# Patient Record
Sex: MALE | Race: WHITE | Employment: FULL TIME | URBAN - METROPOLITAN AREA
[De-identification: names, ages, dates, MRNs, and addresses within clinical notes are randomized per-mention and may not be internally consistent; named-entity substitution may affect disease eponyms.]

---

## 2017-02-20 ENCOUNTER — TELEPHONE (OUTPATIENT)
Dept: FAMILY MEDICINE CLINIC | Age: 52
End: 2017-02-20

## 2017-02-20 DIAGNOSIS — J44.9 CHRONIC OBSTRUCTIVE PULMONARY DISEASE, UNSPECIFIED COPD TYPE (HCC): Primary | ICD-10-CM

## 2017-02-20 RX ORDER — ALBUTEROL SULFATE 90 UG/1
1 AEROSOL, METERED RESPIRATORY (INHALATION)
Qty: 1 INHALER | Refills: 2 | Status: SHIPPED | OUTPATIENT
Start: 2017-02-20 | End: 2019-12-12 | Stop reason: SDUPTHER

## 2017-02-20 RX ORDER — FLUTICASONE PROPIONATE AND SALMETEROL 250; 50 UG/1; UG/1
1 POWDER RESPIRATORY (INHALATION) EVERY 12 HOURS
Qty: 1 INHALER | Refills: 5 | Status: SHIPPED | OUTPATIENT
Start: 2017-02-20 | End: 2017-02-22 | Stop reason: CLARIF

## 2017-02-20 NOTE — TELEPHONE ENCOUNTER
Pt needs a refill on his inhalers, ins will not cover the current inhalers (Dulera and albuterol) it will cover the Advair or symbicort in place of Dulera and Proair HFA or Entolin HFA in place of albuterol, CB# [phone] to speak with wife, Sharmila Jay on hipaa.

## 2017-03-18 ENCOUNTER — ED HISTORICAL/CONVERTED ENCOUNTER (OUTPATIENT)
Dept: OTHER | Age: 52
End: 2017-03-18

## 2017-05-02 ENCOUNTER — DOCUMENTATION ONLY (OUTPATIENT)
Dept: FAMILY MEDICINE CLINIC | Age: 52
End: 2017-05-02

## 2017-05-04 ENCOUNTER — TELEPHONE (OUTPATIENT)
Dept: FAMILY MEDICINE CLINIC | Age: 52
End: 2017-05-04

## 2017-05-04 NOTE — TELEPHONE ENCOUNTER
[facility] called wanting to change the [facility] to something else, isn't covered even with prior auth    [phone]

## 2017-05-25 DIAGNOSIS — E03.9 HYPOTHYROIDISM, UNSPECIFIED TYPE: ICD-10-CM

## 2017-05-26 RX ORDER — LEVOTHYROXINE SODIUM 25 UG/1
TABLET ORAL
Qty: 30 TAB | Refills: 2 | Status: SHIPPED | OUTPATIENT
Start: 2017-05-26 | End: 2017-08-29 | Stop reason: SDUPTHER

## 2017-08-29 DIAGNOSIS — E03.9 HYPOTHYROIDISM, UNSPECIFIED TYPE: ICD-10-CM

## 2017-08-29 RX ORDER — LEVOTHYROXINE SODIUM 25 UG/1
TABLET ORAL
Qty: 30 TAB | Refills: 0 | Status: SHIPPED | OUTPATIENT
Start: 2017-08-29 | End: 2017-10-02 | Stop reason: SDUPTHER

## 2017-09-12 ENCOUNTER — OFFICE VISIT (OUTPATIENT)
Dept: FAMILY MEDICINE CLINIC | Age: 52
End: 2017-09-12

## 2017-09-12 VITALS
OXYGEN SATURATION: 98 % | SYSTOLIC BLOOD PRESSURE: 132 MMHG | HEIGHT: 71 IN | DIASTOLIC BLOOD PRESSURE: 88 MMHG | RESPIRATION RATE: 17 BRPM | TEMPERATURE: 98.2 F | WEIGHT: 199.2 LBS | HEART RATE: 74 BPM | BODY MASS INDEX: 27.89 KG/M2

## 2017-09-12 DIAGNOSIS — R73.03 PREDIABETES: Primary | ICD-10-CM

## 2017-09-12 DIAGNOSIS — G47.00 INSOMNIA, UNSPECIFIED: ICD-10-CM

## 2017-09-12 DIAGNOSIS — E03.9 HYPOTHYROIDISM, UNSPECIFIED TYPE: ICD-10-CM

## 2017-09-12 DIAGNOSIS — G47.00 INSOMNIA, UNSPECIFIED TYPE: ICD-10-CM

## 2017-09-12 DIAGNOSIS — E78.2 MIXED HYPERLIPIDEMIA: ICD-10-CM

## 2017-09-12 DIAGNOSIS — Z12.11 SCREEN FOR COLON CANCER: ICD-10-CM

## 2017-09-12 RX ORDER — CYCLOBENZAPRINE HCL 10 MG
10 TABLET ORAL
Qty: 30 TAB | Refills: 2 | Status: SHIPPED | OUTPATIENT
Start: 2017-09-12 | End: 2018-06-01 | Stop reason: ALTCHOICE

## 2017-09-12 RX ORDER — ZOLPIDEM TARTRATE 10 MG/1
10 TABLET ORAL
Qty: 30 TAB | Refills: 2 | Status: SHIPPED | OUTPATIENT
Start: 2017-09-12 | End: 2018-06-01 | Stop reason: SDUPTHER

## 2017-09-12 NOTE — PROGRESS NOTES
Chief Complaint   Patient presents with    Labs     Patient is fasting    Medication Refill     Ambien    Headache     Tension in back of neck     Patient seen in the office today for fasting labs, med refill and c/o of increased tension in the back of neck    Pt is not interested in a colonoscopy at this time, will do the FIT test.     Pt declined a flu vaccine. Subjective: (As above and below)     Chief Complaint   Patient presents with    Labs     Patient is fasting    Medication Refill     Ambien    Headache     Tension in back of neck     he is a 45y.o. year old male who presents for evaluation. Reviewed PmHx, RxHx, FmHx, SocHx, AllgHx and updated in chart. Review of Systems - negative except as listed above    Objective:     Vitals:    09/12/17 0723   BP: 132/88   Pulse: 74   Resp: 17   Temp: 98.2 °F (36.8 °C)   TempSrc: Oral   SpO2: 98%   Weight: 199 lb 3.2 oz (90.4 kg)   Height: 5' 11\" (1.803 m)     Physical Examination: General appearance - alert, well appearing, and in no distress  Mental status - normal mood, behavior, speech, dress, motor activity, and thought processes  Mouth - mucous membranes moist, pharynx normal without lesions  Chest - clear to auscultation, no wheezes, rales or rhonchi, symmetric air entry  Heart - normal rate, regular rhythm, normal S1, S2, no murmurs, rubs, clicks or gallops  Musculoskeletal - no joint tenderness, deformity or swelling  Extremities - peripheral pulses normal, no pedal edema, no clubbing or cyanosis    Assessment/ Plan:   1. Prediabetes  -check fasting labs  - CBC WITH AUTOMATED DIFF  - HEMOGLOBIN A1C WITH EAG    2. Mixed hyperlipidemia  - LIPID PANEL  - METABOLIC PANEL, COMPREHENSIVE    3. Hypothyroidism, unspecified type  - TSH 3RD GENERATION  - VITAMIN D, 25 HYDROXY    4. Insomnia, unspecified type  -Ambien controlling symptoms    5. Insomnia, unspecified  - zolpidem (AMBIEN) 10 mg tablet;  Take 1 Tab by mouth nightly as needed for Sleep (- must last 30 days). Dispense: 30 Tab; Refill: 2    6. Screen for colon cancer  - OCCULT BLOOD, IMMUNOASSAY (FIT)     Follow-up Disposition: As needed  I have discussed the diagnosis with the patient and the intended plan as seen in the above orders. The patient has received an after-visit summary and questions were answered concerning future plans.      Medication Side Effects and Warnings were discussed with patient: yes  Patient Labs were reviewed: yes  Patient Past Records were reviewed:  yes    Zi Sandy M.D.

## 2017-09-12 NOTE — MR AVS SNAPSHOT
Visit Information Date & Time Provider Department Dept. Phone Encounter #  
 9/12/2017  7:15 AM Lucian Wilkins MD [address] [facility] [phone] [id number] Upcoming Health Maintenance Date Due FOBT Q 1 YEAR AGE 50-75 10/14/2016 DTaP/Tdap/Td series (2 - Td) 9/13/2023 Allergies as of 9/12/2017  Review Complete On: 9/12/2017 By: Lucian Wilkins MD  
  
 Severity Noted Reaction Type Reactions Voltaren [Diclofenac Sodium]  01/19/2016    Other (comments) Stomach cramps Current Immunizations  Reviewed on 9/13/2013 Name Date Tdap 9/13/2013 Not reviewed this visit You Were Diagnosed With   
  
 Codes Comments Prediabetes    -  Primary ICD-10-CM: R73.03 
ICD-9-CM: 790.29 Mixed hyperlipidemia     ICD-10-CM: E78.2 ICD-9-CM: 272.2 Hypothyroidism, unspecified type     ICD-10-CM: E03.9 ICD-9-CM: 244.9 Insomnia, unspecified type     ICD-10-CM: G47.00 ICD-9-CM: 780.52 Insomnia, unspecified     ICD-10-CM: G47.00 ICD-9-CM: 780.52 Screen for colon cancer     ICD-10-CM: Z12.11 ICD-9-CM: V76.51 Vitals BP Pulse Temp Resp Height(growth percentile) Weight(growth percentile) 132/88 (BP 1 Location: Right arm, BP Patient Position: Sitting) 74 98.2 °F (36.8 °C) (Oral) 17 5' 11\" (1.803 m) 199 lb 3.2 oz (90.4 kg) SpO2 BMI Smoking Status 98% 27.78 kg/m2 Former Smoker Vitals History BMI and BSA Data Body Mass Index Body Surface Area  
 27.78 kg/m 2 2.13 m 2 Preferred Pharmacy Pharmacy Name Phone [facility] [address] [facility] [phone] Your Updated Medication List  
  
   
This list is accurate as of: 9/12/17  8:02 AM.  Always use your most recent med list.  
  
  
  
  
 albuterol 90 mcg/actuation inhaler Commonly known as:  PROAIR HFA  
 Take 1 Puff by inhalation every four (4) hours as needed for Wheezing or Shortness of Breath. cyclobenzaprine 10 mg tablet Commonly known as:  FLEXERIL Take 1 Tab by mouth nightly. fluticasone furoate 200 mcg/actuation Dsdv inhaler Commonly known as:  ARNUITY ELLIPTA Take 1 Puff by inhalation daily. levothyroxine 25 mcg tablet Commonly known as:  SYNTHROID  
TAKE ONE TABLET BY MOUTH EVERY DAY BEFORE BREAKFAST  
  
 zolpidem 10 mg tablet Commonly known as:  AMBIEN Take 1 Tab by mouth nightly as needed for Sleep (- must last 30 days). Prescriptions Printed Refills  
 zolpidem (AMBIEN) 10 mg tablet 2 Sig: Take 1 Tab by mouth nightly as needed for Sleep (- must last 30 days). Class: Print Route: Oral  
  
Prescriptions Sent to Pharmacy Refills  
 cyclobenzaprine (FLEXERIL) 10 mg tablet 2 Sig: Take 1 Tab by mouth nightly. Class: Normal  
 Pharmacy: [facility] [place] [address] [facility] [place] [address] Ph #: [phone] Route: Oral  
  
We Performed the Following CBC WITH AUTOMATED DIFF [93866 CPT(R)] HEMOGLOBIN A1C WITH EAG [89851 CPT(R)] LIPID PANEL [06115 CPT(R)] METABOLIC PANEL, COMPREHENSIVE [49270 CPT(R)] OCCULT BLOOD, IMMUNOASSAY (FIT) [id number] CPT(R)] TSH 3RD GENERATION [16238 CPT(R)] VITAMIN D, 25 HYDROXY [id number] CPT(R)] Introducing [address]! Dear Brian Quarles: 
Thank you for requesting a [employer] account. Our records indicate that you already have an active [employer] account. You can access your account anytime at [email or web address] Did you know that you can access your hospital and ER discharge instructions at any time in 365looks? You can also review all of your test results from your hospital stay or ER visit. Additional Information If you have questions, please visit the Frequently Asked Questions section of the [employer] website at [email or web address] Remember, [employer] is NOT to be used for urgent needs. For medical emergencies, dial 911. Now available from your iPhone and Android! Please provide this summary of care documentation to your next provider. Your primary care clinician is listed as Delmis Seats. If you have any questions after today's visit, please call [phone].

## 2017-09-12 NOTE — PROGRESS NOTES
Chief Complaint   Patient presents with    Labs     Patient is fasting    Medication Refill     Ambien    Headache     Tension in back of neck     Patient seen in the office today for fasting labs, med refill and c/o of increased tension in the back of neck

## 2017-09-13 LAB
25(OH)D3+25(OH)D2 SERPL-MCNC: 23.6 NG/ML (ref 30–100)
ALBUMIN SERPL-MCNC: 4.5 G/DL (ref 3.5–5.5)
ALBUMIN/GLOB SERPL: 1.9 {RATIO} (ref 1.2–2.2)
ALP SERPL-CCNC: 60 IU/L (ref 39–117)
ALT SERPL-CCNC: 10 IU/L (ref 0–44)
AST SERPL-CCNC: 21 IU/L (ref 0–40)
BASOPHILS # BLD AUTO: 0 X10E3/UL (ref 0–0.2)
BASOPHILS NFR BLD AUTO: 1 %
BILIRUB SERPL-MCNC: 0.2 MG/DL (ref 0–1.2)
BUN SERPL-MCNC: 12 MG/DL (ref 6–24)
BUN/CREAT SERPL: 12 (ref 9–20)
CALCIUM SERPL-MCNC: 9.4 MG/DL (ref 8.7–10.2)
CHLORIDE SERPL-SCNC: 105 MMOL/L (ref 96–106)
CHOLEST SERPL-MCNC: 191 MG/DL (ref 100–199)
CO2 SERPL-SCNC: 24 MMOL/L (ref 18–29)
CREAT SERPL-MCNC: 0.99 MG/DL (ref 0.76–1.27)
EOSINOPHIL # BLD AUTO: 0.2 X10E3/UL (ref 0–0.4)
EOSINOPHIL NFR BLD AUTO: 5 %
ERYTHROCYTE [DISTWIDTH] IN BLOOD BY AUTOMATED COUNT: 13.4 % (ref 12.3–15.4)
EST. AVERAGE GLUCOSE BLD GHB EST-MCNC: 117 MG/DL
GLOBULIN SER CALC-MCNC: 2.4 G/DL (ref 1.5–4.5)
GLUCOSE SERPL-MCNC: 97 MG/DL (ref 65–99)
HBA1C MFR BLD: 5.7 % (ref 4.8–5.6)
HCT VFR BLD AUTO: 44.9 % (ref 37.5–51)
HDLC SERPL-MCNC: 60 MG/DL
HGB BLD-MCNC: 14.9 G/DL (ref 12.6–17.7)
IMM GRANULOCYTES # BLD: 0 X10E3/UL (ref 0–0.1)
IMM GRANULOCYTES NFR BLD: 0 %
INTERPRETATION, 910389: NORMAL
LDLC SERPL CALC-MCNC: 113 MG/DL (ref 0–99)
LYMPHOCYTES # BLD AUTO: 2 X10E3/UL (ref 0.7–3.1)
LYMPHOCYTES NFR BLD AUTO: 43 %
MCH RBC QN AUTO: 31.8 PG (ref 26.6–33)
MCHC RBC AUTO-ENTMCNC: 33.2 G/DL (ref 31.5–35.7)
MCV RBC AUTO: 96 FL (ref 79–97)
MONOCYTES # BLD AUTO: 0.4 X10E3/UL (ref 0.1–0.9)
MONOCYTES NFR BLD AUTO: 8 %
NEUTROPHILS # BLD AUTO: 2 X10E3/UL (ref 1.4–7)
NEUTROPHILS NFR BLD AUTO: 43 %
PLATELET # BLD AUTO: 226 X10E3/UL (ref 150–379)
POTASSIUM SERPL-SCNC: 4.3 MMOL/L (ref 3.5–5.2)
PROT SERPL-MCNC: 6.9 G/DL (ref 6–8.5)
RBC # BLD AUTO: 4.68 X10E6/UL (ref 4.14–5.8)
SODIUM SERPL-SCNC: 143 MMOL/L (ref 134–144)
TRIGL SERPL-MCNC: 92 MG/DL (ref 0–149)
TSH SERPL DL<=0.005 MIU/L-ACNC: 3.19 UIU/ML (ref 0.45–4.5)
VLDLC SERPL CALC-MCNC: 18 MG/DL (ref 5–40)
WBC # BLD AUTO: 4.7 X10E3/UL (ref 3.4–10.8)

## 2017-09-13 NOTE — PROGRESS NOTES
Cholesterol is elevated, please closely monitor diet and increase exercise, recheck in 6 months. Vitamin D is slightly low, please take a daily supplement of at least 2000 IU (international units) daily. Increase in risk for diabetes, please closely monitor diet and increase exercise   All other labs are within normal limits. A message has been sent in Smeet and the lab work released to the patient.

## 2017-10-02 DIAGNOSIS — E03.9 HYPOTHYROIDISM, UNSPECIFIED TYPE: ICD-10-CM

## 2017-10-02 RX ORDER — LEVOTHYROXINE SODIUM 25 UG/1
TABLET ORAL
Qty: 30 TAB | Refills: 5 | Status: SHIPPED | OUTPATIENT
Start: 2017-10-02 | End: 2018-05-24 | Stop reason: SDUPTHER

## 2017-12-26 RX ORDER — FLUTICASONE FUROATE 200 UG/1
POWDER RESPIRATORY (INHALATION)
Qty: 30 INHALER | Refills: 2 | Status: SHIPPED | OUTPATIENT
Start: 2017-12-26 | End: 2018-06-01 | Stop reason: ALTCHOICE

## 2018-05-24 DIAGNOSIS — E03.9 HYPOTHYROIDISM, UNSPECIFIED TYPE: ICD-10-CM

## 2018-05-24 RX ORDER — LEVOTHYROXINE SODIUM 25 UG/1
TABLET ORAL
Qty: 30 TAB | Refills: 5 | Status: SHIPPED | OUTPATIENT
Start: 2018-05-24 | End: 2019-01-02 | Stop reason: SDUPTHER

## 2018-06-01 ENCOUNTER — OFFICE VISIT (OUTPATIENT)
Dept: FAMILY MEDICINE CLINIC | Age: 53
End: 2018-06-01

## 2018-06-01 VITALS
TEMPERATURE: 97.9 F | HEIGHT: 71 IN | DIASTOLIC BLOOD PRESSURE: 90 MMHG | HEART RATE: 59 BPM | OXYGEN SATURATION: 98 % | SYSTOLIC BLOOD PRESSURE: 130 MMHG | WEIGHT: 187.13 LBS | RESPIRATION RATE: 16 BRPM | BODY MASS INDEX: 26.2 KG/M2

## 2018-06-01 DIAGNOSIS — E78.2 MIXED HYPERLIPIDEMIA: ICD-10-CM

## 2018-06-01 DIAGNOSIS — R73.03 PREDIABETES: ICD-10-CM

## 2018-06-01 DIAGNOSIS — J01.00 ACUTE MAXILLARY SINUSITIS, RECURRENCE NOT SPECIFIED: ICD-10-CM

## 2018-06-01 DIAGNOSIS — E03.9 HYPOTHYROIDISM, UNSPECIFIED TYPE: ICD-10-CM

## 2018-06-01 DIAGNOSIS — R53.83 FATIGUE, UNSPECIFIED TYPE: ICD-10-CM

## 2018-06-01 DIAGNOSIS — J40 BRONCHITIS: ICD-10-CM

## 2018-06-01 DIAGNOSIS — Z00.00 WELL ADULT EXAM: Primary | ICD-10-CM

## 2018-06-01 DIAGNOSIS — G47.00 INSOMNIA, UNSPECIFIED TYPE: ICD-10-CM

## 2018-06-01 RX ORDER — PREDNISONE 20 MG/1
TABLET ORAL
Refills: 0 | COMMUNITY
Start: 2018-05-21 | End: 2018-06-01 | Stop reason: ALTCHOICE

## 2018-06-01 RX ORDER — CEFDINIR 300 MG/1
300 CAPSULE ORAL 2 TIMES DAILY
Qty: 20 CAP | Refills: 0 | Status: SHIPPED | OUTPATIENT
Start: 2018-06-01 | End: 2018-06-11

## 2018-06-01 RX ORDER — IPRATROPIUM BROMIDE 17 UG/1
AEROSOL, METERED RESPIRATORY (INHALATION)
Refills: 0 | COMMUNITY
Start: 2018-05-21 | End: 2019-12-12 | Stop reason: ALTCHOICE

## 2018-06-01 RX ORDER — FLUTICASONE FUROATE AND VILANTEROL 200; 25 UG/1; UG/1
1 POWDER RESPIRATORY (INHALATION) DAILY
Qty: 1 INHALER | Refills: 3 | Status: SHIPPED | OUTPATIENT
Start: 2018-06-01 | End: 2019-12-12 | Stop reason: ALTCHOICE

## 2018-06-01 RX ORDER — ZOLPIDEM TARTRATE 10 MG/1
10 TABLET ORAL
Qty: 30 TAB | Refills: 2 | Status: SHIPPED | OUTPATIENT
Start: 2018-06-01 | End: 2018-10-03 | Stop reason: SDUPTHER

## 2018-06-01 RX ORDER — ALBUTEROL SULFATE 0.83 MG/ML
SOLUTION RESPIRATORY (INHALATION)
Refills: 0 | COMMUNITY
Start: 2018-05-21 | End: 2019-12-12 | Stop reason: SDUPTHER

## 2018-06-01 NOTE — PROGRESS NOTES
1. Have you been to the ER, urgent care clinic since your last visit? Hospitalized since your last visit? Yes Pt 1st 2 wks ago for cough    2. Have you seen or consulted any other health care providers outside of the [address] [place] [address] since your last visit? Include any pap smears or colon screening. No    Chief Complaint   Patient presents with    Cough     Pt 1st 2 wks ago for cough, is now cough up green mucus & getting worse and sob    Medication Refill    Fatigue     x 1 yr and getting progressively getting worse     Pt states he went to Pt 1st 2 wks ago for cough. He states he is now coughing up green mucus & the cough is getting worse and he is also having sob. He also reports fatigue that is getting progressively worse over the last 1 yrs. He ran out of [facility] in Feb 2018.

## 2018-06-01 NOTE — MR AVS SNAPSHOT
[address] [place] [address] 
[phone] Patient: Shelley Johnson MRN: [id number] :1965 Visit Information Date & Time Provider Department Dept. Phone Encounter #  
 2018 10:15 AM Ahmet Anthony, [address] [phone] [id number] Upcoming Health Maintenance Date Due FOBT Q 1 YEAR AGE 50-75 10/14/2016 Influenza Age 5 to Adult 2018 DTaP/Tdap/Td series (2 - Td) 2023 Allergies as of 2018  Review Complete On: 2018 By: Ahmet Anthony NP Severity Noted Reaction Type Reactions Voltaren [Diclofenac Sodium]  2016    Other (comments) Stomach cramps Current Immunizations  Reviewed on 2013 Name Date Tdap 2013 Not reviewed this visit You Were Diagnosed With   
  
 Codes Comments Bronchitis    -  Primary ICD-10-CM: M92 ICD-9-CM: 785 Acute maxillary sinusitis, recurrence not specified     ICD-10-CM: J01.00 ICD-9-CM: 461.0 Well adult exam     ICD-10-CM: Z00.00 ICD-9-CM: V70.0 Mixed hyperlipidemia     ICD-10-CM: E78.2 ICD-9-CM: 272.2 Prediabetes     ICD-10-CM: R73.03 
ICD-9-CM: 790.29 Hypothyroidism, unspecified type     ICD-10-CM: E03.9 ICD-9-CM: 244.9 Insomnia, unspecified type     ICD-10-CM: G47.00 ICD-9-CM: 780.52 Fatigue, unspecified type     ICD-10-CM: R53.83 ICD-9-CM: 780.79 Vitals BP Pulse Temp Resp Height(growth percentile) Weight(growth percentile) 130/90 (!) 59 97.9 °F (36.6 °C) (Oral) 16 5' 11\" (1.803 m) 187 lb 2 oz (84.9 kg) SpO2 BMI Smoking Status 98% 26.1 kg/m2 Former Smoker Vitals History BMI and BSA Data Body Mass Index Body Surface Area  
 26.1 kg/m 2 2.06 m 2 Preferred Pharmacy Pharmacy Name Phone [facility] [address] [facility] [phone] Your Updated Medication List  
  
   
This list is accurate as of 6/1/18 11:20 AM.  Always use your most recent med list.  
  
  
  
  
 * albuterol 90 mcg/actuation inhaler Commonly known as:  PROAIR HFA Take 1 Puff by inhalation every four (4) hours as needed for Wheezing or Shortness of Breath. * albuterol 2.5 mg /3 mL (0.083 %) nebulizer solution Commonly known as:  PROVENTIL VENTOLIN  
INL 1 VIAL VIA THE NEB Q 4 TO 6 H PRN  
  
 ARNUITY ELLIPTA 200 mcg/actuation Dsdv inhaler Generic drug:  fluticasone furoate INHALE 1 PUFF BY MOUTH DAILY  
  
 ATROVENT HFA 17 mcg/actuation inhaler Generic drug:  ipratropium INL 2 PFS PO QID  
  
 cefdinir 300 mg capsule Commonly known as:  OMNICEF Take 1 Cap by mouth two (2) times a day for 10 days. levothyroxine 25 mcg tablet Commonly known as:  SYNTHROID  
TAKE ONE TABLET BY MOUTH EVERY DAY BEFORE BREAKFAST  
  
 zolpidem 10 mg tablet Commonly known as:  AMBIEN Take 1 Tab by mouth nightly as needed for Sleep (- must last 30 days). * Notice: This list has 2 medication(s) that are the same as other medications prescribed for you. Read the directions carefully, and ask your doctor or other care provider to review them with you. Prescriptions Printed Refills  
 zolpidem (AMBIEN) 10 mg tablet 2 Sig: Take 1 Tab by mouth nightly as needed for Sleep (- must last 30 days). Class: Print Route: Oral  
  
Prescriptions Sent to Pharmacy Refills  
 cefdinir (OMNICEF) 300 mg capsule 0 Sig: Take 1 Cap by mouth two (2) times a day for 10 days. Class: Normal  
 [facility] [address] [facility] [address] #: [phone] Route: Oral  
  
We Performed the Following CBC WITH AUTOMATED DIFF [04437 CPT(R)] HEMOGLOBIN A1C WITH EAG [00248 CPT(R)] LIPID PANEL [86547 CPT(R)] METABOLIC PANEL, COMPREHENSIVE [32510 CPT(R)] TESTOSTERONE, FREE & TOTAL [15279 CPT(R)] TSH 3RD GENERATION [56927 CPT(R)] VITAMIN B12 [id number] CPT(R)] VITAMIN D, 25 HYDROXY [id number] CPT(R)] Introducing [facility]! Dear Danika Honeycutt: 
Thank you for requesting a "[employer] account. Our records indicate that you already have an active "[employer] account. You can access your account anytime at [email or web address] Did you know that you can access your hospital and ER discharge instructions at any time in "[employer]? You can also review all of your test results from your hospital stay or ER visit. Additional Information If you have questions, please visit the Frequently Asked Questions section of the "[employer] website at [email or web address] Remember, "[employer] is NOT to be used for urgent needs. For medical emergencies, dial 911. Now available from your iPhone and Android! Please provide this summary of care documentation to your next provider. Your primary care clinician is listed as Deidre De Jesus. If you have any questions after today's visit, please call [phone].

## 2018-06-05 LAB
25(OH)D3+25(OH)D2 SERPL-MCNC: NORMAL NG/ML
ALBUMIN SERPL-MCNC: 4.4 G/DL (ref 3.5–5.5)
ALBUMIN/GLOB SERPL: 1.6 {RATIO} (ref 1.2–2.2)
ALP SERPL-CCNC: 68 IU/L (ref 39–117)
ALT SERPL-CCNC: 10 IU/L (ref 0–44)
AST SERPL-CCNC: 12 IU/L (ref 0–40)
BASOPHILS # BLD AUTO: 0 X10E3/UL (ref 0–0.2)
BASOPHILS NFR BLD AUTO: 1 %
BILIRUB SERPL-MCNC: 0.6 MG/DL (ref 0–1.2)
BUN SERPL-MCNC: 10 MG/DL (ref 6–24)
BUN/CREAT SERPL: 10 (ref 9–20)
CALCIUM SERPL-MCNC: 9.4 MG/DL (ref 8.7–10.2)
CHLORIDE SERPL-SCNC: 103 MMOL/L (ref 96–106)
CHOLEST SERPL-MCNC: 203 MG/DL (ref 100–199)
CO2 SERPL-SCNC: 18 MMOL/L (ref 18–29)
CREAT SERPL-MCNC: 1.03 MG/DL (ref 0.76–1.27)
EOSINOPHIL # BLD AUTO: 0.4 X10E3/UL (ref 0–0.4)
EOSINOPHIL NFR BLD AUTO: 6 %
ERYTHROCYTE [DISTWIDTH] IN BLOOD BY AUTOMATED COUNT: 13.1 % (ref 12.3–15.4)
EST. AVERAGE GLUCOSE BLD GHB EST-MCNC: 117 MG/DL
GFR SERPLBLD CREATININE-BSD FMLA CKD-EPI: 82 ML/MIN/1.73
GFR SERPLBLD CREATININE-BSD FMLA CKD-EPI: 95 ML/MIN/1.73
GLOBULIN SER CALC-MCNC: 2.8 G/DL (ref 1.5–4.5)
GLUCOSE SERPL-MCNC: 81 MG/DL (ref 65–99)
HBA1C MFR BLD: 5.7 % (ref 4.8–5.6)
HCT VFR BLD AUTO: 44.1 % (ref 37.5–51)
HDLC SERPL-MCNC: 55 MG/DL
HGB BLD-MCNC: 14.5 G/DL (ref 13–17.7)
IMM GRANULOCYTES # BLD: 0.1 X10E3/UL (ref 0–0.1)
IMM GRANULOCYTES NFR BLD: 1 %
INTERPRETATION, 910389: NORMAL
LDLC SERPL CALC-MCNC: 135 MG/DL (ref 0–99)
LYMPHOCYTES # BLD AUTO: 2.5 X10E3/UL (ref 0.7–3.1)
LYMPHOCYTES NFR BLD AUTO: 35 %
MCH RBC QN AUTO: 30.7 PG (ref 26.6–33)
MCHC RBC AUTO-ENTMCNC: 32.9 G/DL (ref 31.5–35.7)
MCV RBC AUTO: 93 FL (ref 79–97)
MONOCYTES # BLD AUTO: 0.6 X10E3/UL (ref 0.1–0.9)
MONOCYTES NFR BLD AUTO: 9 %
NEUTROPHILS # BLD AUTO: 3.5 X10E3/UL (ref 1.4–7)
NEUTROPHILS NFR BLD AUTO: 48 %
PLATELET # BLD AUTO: 277 X10E3/UL (ref 150–379)
POTASSIUM SERPL-SCNC: 4.7 MMOL/L (ref 3.5–5.2)
PROT SERPL-MCNC: 7.2 G/DL (ref 6–8.5)
RBC # BLD AUTO: 4.72 X10E6/UL (ref 4.14–5.8)
SODIUM SERPL-SCNC: 140 MMOL/L (ref 134–144)
TESTOST FREE SERPL-MCNC: 10.8 PG/ML (ref 7.2–24)
TESTOST SERPL-MCNC: 522 NG/DL (ref 264–916)
TRIGL SERPL-MCNC: 67 MG/DL (ref 0–149)
TSH SERPL DL<=0.005 MIU/L-ACNC: 2 UIU/ML (ref 0.45–4.5)
VLDLC SERPL CALC-MCNC: 13 MG/DL (ref 5–40)
WBC # BLD AUTO: 7.2 X10E3/UL (ref 3.4–10.8)

## 2018-06-05 NOTE — PROGRESS NOTES
Subjective:   Harriet Gamez is a 45 y.o. male presenting for his annual checkup. ROS:  Feeling well. No dyspnea or chest pain on exertion. No abdominal pain, change in bowel habits, black or bloody stools. No urinary tract or prostatic symptoms. No neurological complaints. Specific concerns today: he is having severe fatigue, cough, green sputum and low grade fevers x 2 weeks; he is extremely fatigued, not sleeping, stressed, out of ambien several mo and making it worse. Patient Active Problem List    Diagnosis Date Noted    Chronic obstructive pulmonary disease ([facility] [address] 09/12/2016    Hypothyroidism 09/06/2016    Prediabetes 07/26/2016    Mixed hyperlipidemia 07/26/2016    Insomnia 05/24/2016     Current Outpatient Prescriptions   Medication Sig Dispense Refill    ATROVENT HFA 17 mcg/actuation inhaler INL 2 PFS PO QID  0    cefdinir (OMNICEF) 300 mg capsule Take 1 Cap by mouth two (2) times a day for 10 days. 20 Cap 0    zolpidem (AMBIEN) 10 mg tablet Take 1 Tab by mouth nightly as needed for Sleep (- must last 30 days). 30 Tab 2    fluticasone-vilanterol (BREO ELLIPTA) 200-25 mcg/dose inhaler Take 1 Puff by inhalation daily. 1 Inhaler 3    levothyroxine (SYNTHROID) 25 mcg tablet TAKE ONE TABLET BY MOUTH EVERY DAY BEFORE BREAKFAST 30 Tab 5    albuterol (PROVENTIL VENTOLIN) 2.5 mg /3 mL (0.083 %) nebulizer solution INL 1 VIAL VIA THE NEB Q 4 TO 6 H PRN  0    albuterol (PROAIR HFA) 90 mcg/actuation inhaler Take 1 Puff by inhalation every four (4) hours as needed for Wheezing or Shortness of Breath.  1 Inhaler 2     Family History   Problem Relation Age of Onset    Alcohol abuse Father      Social History   Substance Use Topics    Smoking status: Former Smoker    Smokeless tobacco: Former User    Alcohol use Yes      Comment: rare          Objective:     Visit Vitals    /90    Pulse (!) 59    Temp 97.9 °F (36.6 °C) (Oral)    Resp 16    Ht 5' 11\" (1.803 m)    Wt 187 lb 2 oz (84.9 kg)    SpO2 98%    BMI 26.1 kg/m2     The patient appears well, alert, oriented x 3, in no distress. ENT normal.  Neck supple. No adenopathy or thyromegaly. MIMI. Lungs Rhonchi and wheeze noted throughout all lung fields. S1 and S2 normal, no murmurs, regular rate and rhythm. Abdomen is soft without tenderness, guarding, mass or organomegaly.  exam: deferred. Extremities show no edema, normal peripheral pulses. Neurological is normal without focal findings. Assessment/Plan:   healthy adult male  increase physical activity, follow low fat diet, follow low salt diet, routine labs ordered. Encounter Diagnoses   Name Primary?  Well adult exam Yes    Bronchitis     Acute maxillary sinusitis, recurrence not specified     Mixed hyperlipidemia     Prediabetes     Hypothyroidism, unspecified type     Insomnia, unspecified type     Fatigue, unspecified type      Orders Placed This Encounter    CBC WITH AUTOMATED DIFF    LIPID PANEL    METABOLIC PANEL, COMPREHENSIVE    TSH 3RD GENERATION    HEMOGLOBIN A1C WITH EAG    VITAMIN B12    VITAMIN D, 25 HYDROXY    TESTOSTERONE, FREE & TOTAL      ATROVENT HFA 17 mcg/actuation inhaler    albuterol (PROVENTIL VENTOLIN) 2.5 mg /3 mL (0.083 %) nebulizer solution    cefdinir (OMNICEF) 300 mg capsule    zolpidem (AMBIEN) 10 mg tablet    fluticasone-vilanterol (BREO ELLIPTA) 200-25 mcg/dose inhaler   . Labs updated today for routine and fatigue type labs  rx refilled for breo and given omnicef/albuterol for bronchitis  Reveiwed adr/se of medication  Push fluids, rest, suggested mucinex for congestion and drainage  Recheck 5-7 days if sx not improved. I have discussed the diagnosis with the patient and the intended plan as seen in the above orders. The patient has received an after-visit summary and questions were answered concerning future plans. Patient conveyed understanding of the plan at the time of the visit.     Lisa Miranda, MSN, ANP 6/5/2018

## 2018-06-06 NOTE — PROGRESS NOTES
Hey there, your labs overall look pretty good, testosterone in good range, no diabetes, liver/kidneys in good range, no anemia. Your cholesterol is borderline so watch the diet for red meat/pork, dairy products, and fried/fast foods.   Recheck labs fasting 6 months, Corinne

## 2018-10-03 DIAGNOSIS — G47.00 INSOMNIA, UNSPECIFIED TYPE: ICD-10-CM

## 2018-10-05 RX ORDER — ZOLPIDEM TARTRATE 10 MG/1
10 TABLET ORAL
Qty: 30 TAB | Refills: 2 | OUTPATIENT
Start: 2018-10-05 | End: 2019-02-12 | Stop reason: SDUPTHER

## 2019-01-02 DIAGNOSIS — E03.9 HYPOTHYROIDISM, UNSPECIFIED TYPE: ICD-10-CM

## 2019-01-02 RX ORDER — LEVOTHYROXINE SODIUM 25 UG/1
TABLET ORAL
Qty: 30 TAB | Refills: 5 | Status: SHIPPED | OUTPATIENT
Start: 2019-01-02 | End: 2019-07-26 | Stop reason: SDUPTHER

## 2019-01-02 NOTE — TELEPHONE ENCOUNTER
Pt called requesting refills on RX. Reports he is going to schedule an appt for lab work within the next month, but only has 2 pills remaining.

## 2019-02-12 ENCOUNTER — OFFICE VISIT (OUTPATIENT)
Dept: FAMILY MEDICINE CLINIC | Age: 54
End: 2019-02-12

## 2019-02-12 VITALS
TEMPERATURE: 98.7 F | BODY MASS INDEX: 27.3 KG/M2 | RESPIRATION RATE: 146 BRPM | WEIGHT: 195 LBS | HEIGHT: 71 IN | SYSTOLIC BLOOD PRESSURE: 147 MMHG | DIASTOLIC BLOOD PRESSURE: 90 MMHG | HEART RATE: 79 BPM

## 2019-02-12 DIAGNOSIS — M25.511 ACUTE PAIN OF RIGHT SHOULDER: Primary | ICD-10-CM

## 2019-02-12 DIAGNOSIS — G47.00 INSOMNIA, UNSPECIFIED TYPE: ICD-10-CM

## 2019-02-12 RX ORDER — ZOLPIDEM TARTRATE 10 MG/1
10 TABLET ORAL
Qty: 30 TAB | Refills: 2 | Status: SHIPPED | OUTPATIENT
Start: 2019-02-12 | End: 2019-09-04 | Stop reason: SDUPTHER

## 2019-02-12 NOTE — PROGRESS NOTES
Chief Complaint   Patient presents with    Medication Refill    Shoulder Pain     Rgiht      Pt seen in the office today needs a refill on Ambien. Pt was seen in January at [facility] for a shoulder issue, was given steroids and muscle relaxer, shoulder did not get better. Pt reports that at some point his shoulder popped and it seemed to improve since then, however strength and occasional burning in joint continue. Pt has not been able to return to sleeping in bed due to pain in shoulder. Pt is not sleeping well even then. Subjective: (As above and below)     Chief Complaint   Patient presents with    Medication Refill    Shoulder Pain     Rgiht      he is a 52y.o. year old male who presents for evaluation. Reviewed PmHx, RxHx, FmHx, SocHx, AllgHx and updated in chart. Review of Systems - negative except as listed above    Objective:     Vitals:    02/12/19 1021 02/12/19 1036   BP: (!) 158/102 147/90   Pulse: 78 79   Resp: (!) 146    Temp: 98.7 °F (37.1 °C)    TempSrc: Oral    Weight: 195 lb (88.5 kg)    Height: 5' 11\" (1.803 m)      Physical Examination: General appearance - alert, well appearing, and in no distress  Mental status - normal mood, behavior, speech, dress, motor activity, and thought processes  Mouth - mucous membranes moist, pharynx normal without lesions  Chest - clear to auscultation, no wheezes, rales or rhonchi, symmetric air entry  Heart - normal rate, regular rhythm, normal S1, S2, no murmurs, rubs, clicks or gallops  Musculoskeletal - weakness with extended arm    Assessment/ Plan:   1. Insomnia, unspecified type  -well controlled, refilled today  - zolpidem (AMBIEN) 10 mg tablet; Take 1 Tab by mouth nightly as needed for Sleep (- must last 30 days). Dispense: 30 Tab; Refill: 2    2.  Acute pain of right shoulder  - REFERRAL TO ORTHOPEDIC SURGERY     Follow-up Disposition: As needed  I have discussed the diagnosis with the patient and the intended plan as seen in the above orders. The patient has received an after-visit summary and questions were answered concerning future plans.      Medication Side Effects and Warnings were discussed with patient: yes  Patient Labs were reviewed: yes  Patient Past Records were reviewed:  yes    Margarette Hogan M.D.

## 2019-07-26 DIAGNOSIS — E03.9 HYPOTHYROIDISM, UNSPECIFIED TYPE: ICD-10-CM

## 2019-07-26 RX ORDER — LEVOTHYROXINE SODIUM 25 UG/1
TABLET ORAL
Qty: 30 TAB | Refills: 0 | Status: SHIPPED | OUTPATIENT
Start: 2019-07-26 | End: 2019-08-28 | Stop reason: SDUPTHER

## 2019-08-28 DIAGNOSIS — E03.9 HYPOTHYROIDISM, UNSPECIFIED TYPE: ICD-10-CM

## 2019-08-28 RX ORDER — LEVOTHYROXINE SODIUM 25 UG/1
TABLET ORAL
Qty: 30 TAB | Refills: 0 | Status: SHIPPED | OUTPATIENT
Start: 2019-08-28 | End: 2019-09-04 | Stop reason: SDUPTHER

## 2019-09-04 ENCOUNTER — OFFICE VISIT (OUTPATIENT)
Dept: FAMILY MEDICINE CLINIC | Age: 54
End: 2019-09-04

## 2019-09-04 VITALS
SYSTOLIC BLOOD PRESSURE: 136 MMHG | OXYGEN SATURATION: 96 % | HEART RATE: 63 BPM | HEIGHT: 71 IN | TEMPERATURE: 98.4 F | BODY MASS INDEX: 26.46 KG/M2 | RESPIRATION RATE: 18 BRPM | WEIGHT: 189 LBS | DIASTOLIC BLOOD PRESSURE: 89 MMHG

## 2019-09-04 DIAGNOSIS — G47.00 INSOMNIA, UNSPECIFIED TYPE: ICD-10-CM

## 2019-09-04 DIAGNOSIS — E78.2 MIXED HYPERLIPIDEMIA: Primary | ICD-10-CM

## 2019-09-04 DIAGNOSIS — R73.03 PREDIABETES: ICD-10-CM

## 2019-09-04 DIAGNOSIS — E03.9 HYPOTHYROIDISM, UNSPECIFIED TYPE: ICD-10-CM

## 2019-09-04 DIAGNOSIS — Z12.11 COLON CANCER SCREENING: ICD-10-CM

## 2019-09-04 RX ORDER — LEVOTHYROXINE SODIUM 25 UG/1
TABLET ORAL
Qty: 90 TAB | Refills: 3 | Status: SHIPPED | OUTPATIENT
Start: 2019-09-04 | End: 2019-09-05

## 2019-09-04 RX ORDER — ZOLPIDEM TARTRATE 10 MG/1
10 TABLET ORAL
Qty: 30 TAB | Refills: 5 | Status: SHIPPED | OUTPATIENT
Start: 2019-09-04 | End: 2020-03-17

## 2019-09-04 NOTE — PROGRESS NOTES
Patient here for fasting labs. 1. Have you been to the ER, urgent care clinic since your last visit? Hospitalized since your last visit? No    2. Have you seen or consulted any other health care providers outside of the [address] [place] [address] since your last visit? Include any pap smears or colon screening. No         Chief Complaint   Patient presents with    Labs     fasting labs     He is a 62 y.o. male who presents for evalution. Reviewed PmHx, RxHx, FmHx, SocHx, AllgHx and updated and dated in the chart. Patient Active Problem List    Diagnosis    Chronic obstructive pulmonary disease ([facility] [address]    Hypothyroidism    Prediabetes    Mixed hyperlipidemia    Insomnia       Review of Systems - negative except as listed above in the HPI    Objective:     Vitals:    09/04/19 0723   BP: 136/89   Pulse: 63   Resp: 18   Temp: 98.4 °F (36.9 °C)   SpO2: 96%   Weight: 189 lb (85.7 kg)   Height: 5' 11\" (1.803 m)     Physical Examination: General appearance - alert, well appearing, and in no distress  Neck - supple, no significant adenopathy  Chest - clear to auscultation, no wheezes, rales or rhonchi, symmetric air entry  Heart - normal rate, regular rhythm, normal S1, S2, no murmurs, rubs, clicks or gallops  Abdomen - soft, nontender, nondistended, no masses or organomegaly  Extremities - peripheral pulses normal, no pedal edema, no clubbing or cyanosis    Assessment/ Plan:   Diagnoses and all orders for this visit:    1. Mixed hyperlipidemia  -     LIPID PANEL  -     METABOLIC PANEL, COMPREHENSIVE    2. Insomnia, unspecified type  -     zolpidem (AMBIEN) 10 mg tablet; Take 1 Tab by mouth nightly as needed for Sleep (- must last 30 days). 3. Hypothyroidism, unspecified type  -     levothyroxine (SYNTHROID) 25 mcg tablet; TAKE 1 TABLET BY MOUTH EVERY DAY BEFORE BREAKFAST  -     TSH 3RD GENERATION    4.  Prediabetes  -     LIPID PANEL  -     METABOLIC PANEL, COMPREHENSIVE  -     CBC WITH AUTOMATED DIFF  - HEMOGLOBIN A1C WITH EAG     -refer for cologuard    Follow-up and Dispositions    · Return in about 1 year (around 9/4/2020). I have discussed the diagnosis with the patient and the intended plan as seen in the above orders. The patient understands and agrees with the plan. The patient has received an after-visit summary and questions were answered concerning future plans. Medication Side Effects and Warnings were discussed with patient  Patient Labs were reviewed and or requested:  Patient Past Records were reviewed and or requested    Yair Villarreal M.D. There are no Patient Instructions on file for this visit.

## 2019-09-05 DIAGNOSIS — E03.9 HYPOTHYROIDISM, UNSPECIFIED TYPE: ICD-10-CM

## 2019-09-05 LAB
ALBUMIN SERPL-MCNC: 4.8 G/DL (ref 3.5–5.5)
ALBUMIN/GLOB SERPL: 1.9 {RATIO} (ref 1.2–2.2)
ALP SERPL-CCNC: 63 IU/L (ref 39–117)
ALT SERPL-CCNC: 14 IU/L (ref 0–44)
AST SERPL-CCNC: 19 IU/L (ref 0–40)
BASOPHILS # BLD AUTO: 0.1 X10E3/UL (ref 0–0.2)
BASOPHILS NFR BLD AUTO: 1 %
BILIRUB SERPL-MCNC: 0.6 MG/DL (ref 0–1.2)
BUN SERPL-MCNC: 8 MG/DL (ref 6–24)
BUN/CREAT SERPL: 7 (ref 9–20)
CALCIUM SERPL-MCNC: 9.4 MG/DL (ref 8.7–10.2)
CHLORIDE SERPL-SCNC: 103 MMOL/L (ref 96–106)
CHOLEST SERPL-MCNC: 212 MG/DL (ref 100–199)
CO2 SERPL-SCNC: 24 MMOL/L (ref 20–29)
CREAT SERPL-MCNC: 1.11 MG/DL (ref 0.76–1.27)
EOSINOPHIL # BLD AUTO: 0.3 X10E3/UL (ref 0–0.4)
EOSINOPHIL NFR BLD AUTO: 7 %
ERYTHROCYTE [DISTWIDTH] IN BLOOD BY AUTOMATED COUNT: 12.1 % (ref 12.3–15.4)
EST. AVERAGE GLUCOSE BLD GHB EST-MCNC: 114 MG/DL
GLOBULIN SER CALC-MCNC: 2.5 G/DL (ref 1.5–4.5)
GLUCOSE SERPL-MCNC: 86 MG/DL (ref 65–99)
HBA1C MFR BLD: 5.6 % (ref 4.8–5.6)
HCT VFR BLD AUTO: 47.1 % (ref 37.5–51)
HDLC SERPL-MCNC: 58 MG/DL
HGB BLD-MCNC: 15.3 G/DL (ref 13–17.7)
IMM GRANULOCYTES # BLD AUTO: 0 X10E3/UL (ref 0–0.1)
IMM GRANULOCYTES NFR BLD AUTO: 0 %
INTERPRETATION, 910389: NORMAL
LDLC SERPL CALC-MCNC: 141 MG/DL (ref 0–99)
LYMPHOCYTES # BLD AUTO: 1.9 X10E3/UL (ref 0.7–3.1)
LYMPHOCYTES NFR BLD AUTO: 39 %
MCH RBC QN AUTO: 30.6 PG (ref 26.6–33)
MCHC RBC AUTO-ENTMCNC: 32.5 G/DL (ref 31.5–35.7)
MCV RBC AUTO: 94 FL (ref 79–97)
MONOCYTES # BLD AUTO: 0.4 X10E3/UL (ref 0.1–0.9)
MONOCYTES NFR BLD AUTO: 9 %
NEUTROPHILS # BLD AUTO: 2.1 X10E3/UL (ref 1.4–7)
NEUTROPHILS NFR BLD AUTO: 44 %
PLATELET # BLD AUTO: 248 X10E3/UL (ref 150–450)
POTASSIUM SERPL-SCNC: 4.5 MMOL/L (ref 3.5–5.2)
PROT SERPL-MCNC: 7.3 G/DL (ref 6–8.5)
RBC # BLD AUTO: 5 X10E6/UL (ref 4.14–5.8)
SODIUM SERPL-SCNC: 141 MMOL/L (ref 134–144)
TRIGL SERPL-MCNC: 66 MG/DL (ref 0–149)
TSH SERPL DL<=0.005 MIU/L-ACNC: 5.78 UIU/ML (ref 0.45–4.5)
VLDLC SERPL CALC-MCNC: 13 MG/DL (ref 5–40)
WBC # BLD AUTO: 4.7 X10E3/UL (ref 3.4–10.8)

## 2019-09-05 RX ORDER — LEVOTHYROXINE SODIUM 50 UG/1
TABLET ORAL
Qty: 90 TAB | Refills: 1 | Status: SHIPPED | OUTPATIENT
Start: 2019-09-05 | End: 2019-12-12 | Stop reason: SDUPTHER

## 2019-09-09 ENCOUNTER — TELEPHONE (OUTPATIENT)
Dept: FAMILY MEDICINE CLINIC | Age: 54
End: 2019-09-09

## 2019-09-09 NOTE — TELEPHONE ENCOUNTER
Patient called and stated that the pharmacy called and they have a prescription for Leverotheroxin for 0.25 mcg tablets and one for 0.50 mcg. He would like to know which one is right. Please call the pharmacy at [phone].

## 2019-09-29 DIAGNOSIS — E03.9 HYPOTHYROIDISM, UNSPECIFIED TYPE: ICD-10-CM

## 2019-09-30 RX ORDER — LEVOTHYROXINE SODIUM 25 UG/1
TABLET ORAL
Qty: 30 TAB | Refills: 0 | Status: SHIPPED | OUTPATIENT
Start: 2019-09-30 | End: 2019-12-12 | Stop reason: ALTCHOICE

## 2019-12-12 ENCOUNTER — OFFICE VISIT (OUTPATIENT)
Dept: FAMILY MEDICINE CLINIC | Age: 54
End: 2019-12-12

## 2019-12-12 ENCOUNTER — HOSPITAL ENCOUNTER (OUTPATIENT)
Dept: LAB | Age: 54
Discharge: HOME | End: 2019-12-12

## 2019-12-12 VITALS
RESPIRATION RATE: 22 BRPM | OXYGEN SATURATION: 96 % | BODY MASS INDEX: 27.72 KG/M2 | DIASTOLIC BLOOD PRESSURE: 91 MMHG | WEIGHT: 198 LBS | HEIGHT: 71 IN | SYSTOLIC BLOOD PRESSURE: 145 MMHG | HEART RATE: 62 BPM | TEMPERATURE: 97.7 F

## 2019-12-12 DIAGNOSIS — J40 BRONCHITIS: Primary | ICD-10-CM

## 2019-12-12 DIAGNOSIS — R73.03 PREDIABETES: ICD-10-CM

## 2019-12-12 DIAGNOSIS — E03.9 HYPOTHYROIDISM, UNSPECIFIED TYPE: ICD-10-CM

## 2019-12-12 DIAGNOSIS — J44.9 CHRONIC OBSTRUCTIVE PULMONARY DISEASE, UNSPECIFIED COPD TYPE (HCC): ICD-10-CM

## 2019-12-12 LAB — TSH SERPL DL<=0.05 MIU/L-ACNC: 3.16 UIU/ML (ref 0.36–3.74)

## 2019-12-12 RX ORDER — AZITHROMYCIN 250 MG/1
TABLET, FILM COATED ORAL
Qty: 6 TAB | Refills: 0 | Status: SHIPPED | OUTPATIENT
Start: 2019-12-12 | End: 2020-04-06 | Stop reason: ALTCHOICE

## 2019-12-12 RX ORDER — LEVOTHYROXINE SODIUM 50 UG/1
TABLET ORAL
Qty: 90 TAB | Refills: 1 | Status: SHIPPED | OUTPATIENT
Start: 2019-12-12 | End: 2020-01-13 | Stop reason: SDUPTHER

## 2019-12-12 RX ORDER — ALBUTEROL SULFATE 0.83 MG/ML
2.5 SOLUTION RESPIRATORY (INHALATION)
Qty: 60 NEBULE | Refills: 3 | Status: SHIPPED | OUTPATIENT
Start: 2019-12-12 | End: 2020-04-06 | Stop reason: SDUPTHER

## 2019-12-12 RX ORDER — PREDNISONE 10 MG/1
TABLET ORAL
Qty: 1 PACKAGE | Refills: 0 | Status: SHIPPED | OUTPATIENT
Start: 2019-12-12 | End: 2020-04-06 | Stop reason: ALTCHOICE

## 2019-12-12 RX ORDER — ALBUTEROL SULFATE 90 UG/1
1 AEROSOL, METERED RESPIRATORY (INHALATION)
Qty: 1 INHALER | Refills: 3 | Status: SHIPPED | OUTPATIENT
Start: 2019-12-12 | End: 2020-12-31 | Stop reason: SDUPTHER

## 2019-12-12 NOTE — PROGRESS NOTES
Patient here for shortness of breath and cough. He had a uri during Thanksgiving and that seems to have triggered asthma or copd. Patient experience chest tightness. Patient does get relief from nebulizer treatments. 1. Have you been to the ER, urgent care clinic since your last visit? Hospitalized since your last visit? No    2. Have you seen or consulted any other health care providers outside of the [address] [place] [address] since your last visit? Include any pap smears or colon screening. No       Chief Complaint   Patient presents with    Shortness of Breath     hx copd     He is a 62 y.o. male who presents for evalution. Reviewed PmHx, RxHx, FmHx, SocHx, AllgHx and updated and dated in the chart. Patient Active Problem List    Diagnosis    Chronic obstructive pulmonary disease ([facility]    Hypothyroidism    Prediabetes    Mixed hyperlipidemia    Insomnia       Review of Systems - negative except as listed above in the HPI    Objective:     Vitals:    12/12/19 0808   BP: (!) 145/91   Pulse: 62   Resp: 22   Temp: 97.7 °F (36.5 °C)   SpO2: 96%   Weight: 198 lb (89.8 kg)   Height: 5' 11\" (1.803 m)     Physical Examination: General appearance - alert, well appearing, and in no distress  Neck - supple, no significant adenopathy  Chest - wheezing noted bilat exp, rales noted none, decreased air entry noted none, rhonchi noted diff  Heart - normal rate, regular rhythm, normal S1, S2, no murmurs, rubs, clicks or gallops  Abdomen - soft, nontender, nondistended, no masses or organomegaly    Assessment/ Plan:   Diagnoses and all orders for this visit:    1. Bronchitis  -     albuterol (PROAIR HFA) 90 mcg/actuation inhaler; Take 1 Puff by inhalation every four (4) hours as needed for Wheezing or Shortness of Breath. -     albuterol (PROVENTIL VENTOLIN) 2.5 mg /3 mL (0.083 %) nebu;  Take 3 mL by inhalation every four (4) hours as needed for Wheezing.  -     predniSONE (STERAPRED DS) 10 mg dose pack; 6 Day DS taper pack as directed  -     azithromycin (ZITHROMAX) 250 mg tablet; Take two tablets today then one tablet daily    2. Hypothyroidism, unspecified type  -     TSH 3RD GENERATION; Future  -     levothyroxine (SYNTHROID) 50 mcg tablet; TAKE 1 TABLET BY MOUTH EVERY DAY BEFORE BREAKFAST  -keturah inc in rx last OV    3. Chronic obstructive pulmonary disease, unspecified COPD type (HCC)  -     albuterol (PROAIR HFA) 90 mcg/actuation inhaler; Take 1 Puff by inhalation every four (4) hours as needed for Wheezing or Shortness of Breath. -     albuterol (PROVENTIL VENTOLIN) 2.5 mg /3 mL (0.083 %) nebu; Take 3 mL by inhalation every four (4) hours as needed for Wheezing. 4. Prediabetes  Lab Results   Component Value Date/Time    Hemoglobin A1c 5.6 09/04/2019 07:59 AM     -at goal       Follow-up and Dispositions    · Return in about 6 months (around 6/12/2020). I have discussed the diagnosis with the patient and the intended plan as seen in the above orders. The patient understands and agrees with the plan. The patient has received an after-visit summary and questions were answered concerning future plans. Medication Side Effects and Warnings were discussed with patient  Patient Labs were reviewed and or requested:  Patient Past Records were reviewed and or requested    Ilda Davis M.D. There are no Patient Instructions on file for this visit.

## 2020-01-13 DIAGNOSIS — E03.9 HYPOTHYROIDISM, UNSPECIFIED TYPE: ICD-10-CM

## 2020-01-13 RX ORDER — LEVOTHYROXINE SODIUM 50 UG/1
TABLET ORAL
Qty: 90 TAB | Refills: 1 | Status: SHIPPED | OUTPATIENT
Start: 2020-01-13 | End: 2021-01-18 | Stop reason: SDUPTHER

## 2020-03-14 DIAGNOSIS — G47.00 INSOMNIA, UNSPECIFIED TYPE: ICD-10-CM

## 2020-03-17 RX ORDER — ZOLPIDEM TARTRATE 10 MG/1
TABLET ORAL
Qty: 30 TAB | Refills: 2 | Status: SHIPPED | OUTPATIENT
Start: 2020-03-17 | End: 2020-06-23

## 2020-03-30 RX ORDER — CARBINOXAMINE MALEATE 4 MG/1
1 TABLET ORAL 2 TIMES DAILY
Qty: 60 TAB | Refills: 5 | Status: SHIPPED | OUTPATIENT
Start: 2020-03-30 | End: 2021-05-12 | Stop reason: SDUPTHER

## 2020-04-06 ENCOUNTER — OFFICE VISIT (OUTPATIENT)
Dept: PRIMARY CARE CLINIC | Age: 55
End: 2020-04-06

## 2020-04-06 VITALS — TEMPERATURE: 98.1 F | HEART RATE: 83 BPM | RESPIRATION RATE: 22 BRPM | OXYGEN SATURATION: 95 %

## 2020-04-06 DIAGNOSIS — J44.9 CHRONIC OBSTRUCTIVE PULMONARY DISEASE, UNSPECIFIED COPD TYPE (HCC): ICD-10-CM

## 2020-04-06 DIAGNOSIS — J40 BRONCHITIS: Primary | ICD-10-CM

## 2020-04-06 DIAGNOSIS — J30.1 ALLERGIC RHINITIS DUE TO POLLEN, UNSPECIFIED SEASONALITY: ICD-10-CM

## 2020-04-06 DIAGNOSIS — R06.02 SHORTNESS OF BREATH: ICD-10-CM

## 2020-04-06 RX ORDER — PREDNISONE 10 MG/1
TABLET ORAL
Qty: 21 TAB | Refills: 0 | Status: SHIPPED | OUTPATIENT
Start: 2020-04-06 | End: 2021-07-20

## 2020-04-06 RX ORDER — DOXYCYCLINE 100 MG/1
100 TABLET ORAL 2 TIMES DAILY
Qty: 20 TAB | Refills: 0 | Status: SHIPPED | OUTPATIENT
Start: 2020-04-06 | End: 2020-04-16

## 2020-04-06 RX ORDER — ALBUTEROL SULFATE 0.83 MG/ML
2.5 SOLUTION RESPIRATORY (INHALATION)
Qty: 60 NEBULE | Refills: 3 | Status: SHIPPED | OUTPATIENT
Start: 2020-04-06 | End: 2022-10-28 | Stop reason: SDUPTHER

## 2020-06-23 DIAGNOSIS — G47.00 INSOMNIA, UNSPECIFIED TYPE: ICD-10-CM

## 2020-06-23 RX ORDER — ZOLPIDEM TARTRATE 10 MG/1
TABLET ORAL
Qty: 30 TAB | Refills: 1 | Status: SHIPPED | OUTPATIENT
Start: 2020-06-23 | End: 2020-08-24

## 2020-08-21 DIAGNOSIS — G47.00 INSOMNIA, UNSPECIFIED TYPE: ICD-10-CM

## 2020-08-24 RX ORDER — ZOLPIDEM TARTRATE 10 MG/1
TABLET ORAL
Qty: 30 TAB | Refills: 1 | Status: SHIPPED | OUTPATIENT
Start: 2020-08-24 | End: 2020-10-19

## 2020-10-19 DIAGNOSIS — G47.00 INSOMNIA, UNSPECIFIED TYPE: ICD-10-CM

## 2020-10-19 RX ORDER — ZOLPIDEM TARTRATE 10 MG/1
TABLET ORAL
Qty: 30 TAB | Refills: 1 | Status: SHIPPED | OUTPATIENT
Start: 2020-10-19 | End: 2020-11-10

## 2020-10-28 ENCOUNTER — PATIENT MESSAGE (OUTPATIENT)
Dept: FAMILY MEDICINE CLINIC | Age: 55
End: 2020-10-28

## 2020-11-10 ENCOUNTER — OFFICE VISIT (OUTPATIENT)
Dept: FAMILY MEDICINE CLINIC | Age: 55
End: 2020-11-10
Payer: COMMERCIAL

## 2020-11-10 VITALS
HEIGHT: 71 IN | BODY MASS INDEX: 26.54 KG/M2 | RESPIRATION RATE: 16 BRPM | DIASTOLIC BLOOD PRESSURE: 94 MMHG | HEART RATE: 64 BPM | SYSTOLIC BLOOD PRESSURE: 146 MMHG | TEMPERATURE: 98.8 F | OXYGEN SATURATION: 98 % | WEIGHT: 189.6 LBS

## 2020-11-10 DIAGNOSIS — E78.2 MIXED HYPERLIPIDEMIA: ICD-10-CM

## 2020-11-10 DIAGNOSIS — J44.9 CHRONIC OBSTRUCTIVE PULMONARY DISEASE, UNSPECIFIED COPD TYPE (HCC): ICD-10-CM

## 2020-11-10 DIAGNOSIS — Z12.5 PROSTATE CANCER SCREENING: ICD-10-CM

## 2020-11-10 DIAGNOSIS — G47.00 INSOMNIA, UNSPECIFIED TYPE: ICD-10-CM

## 2020-11-10 DIAGNOSIS — M79.672 BILATERAL FOOT PAIN: Primary | ICD-10-CM

## 2020-11-10 DIAGNOSIS — E03.9 HYPOTHYROIDISM, UNSPECIFIED TYPE: ICD-10-CM

## 2020-11-10 DIAGNOSIS — R03.0 ELEVATED BLOOD PRESSURE READING: ICD-10-CM

## 2020-11-10 DIAGNOSIS — M79.671 BILATERAL FOOT PAIN: Primary | ICD-10-CM

## 2020-11-10 DIAGNOSIS — R73.03 PREDIABETES: ICD-10-CM

## 2020-11-10 DIAGNOSIS — B35.1 FUNGAL TOENAIL INFECTION: ICD-10-CM

## 2020-11-10 PROCEDURE — 99214 OFFICE O/P EST MOD 30 MIN: CPT | Performed by: FAMILY MEDICINE

## 2020-11-10 RX ORDER — ESZOPICLONE 3 MG/1
3 TABLET, FILM COATED ORAL
Qty: 30 TAB | Refills: 1 | Status: SHIPPED | OUTPATIENT
Start: 2020-11-10 | End: 2021-01-07 | Stop reason: SDUPTHER

## 2020-11-10 RX ORDER — TERBINAFINE HYDROCHLORIDE 250 MG/1
250 TABLET ORAL DAILY
Qty: 30 TAB | Refills: 2 | Status: SHIPPED | OUTPATIENT
Start: 2020-11-10 | End: 2021-02-08 | Stop reason: SDUPTHER

## 2020-11-10 NOTE — PROGRESS NOTES
Chief Complaint   Patient presents with    Sleep Problem    Labs    Foot Pain     arch,ankle,bunion     1. Have you been to the ER, urgent care clinic since your last visit? Hospitalized since your last visit? No    2. Have you seen or consulted any other health care providers outside of the [address] [place] [address] since your last visit? Include any pap smears or colon screening. No             Chief Complaint   Patient presents with    Sleep Problem    Labs    Foot Pain     arch,ankle,bunion     He is a 60 y.o. male who presents for evalution. Reviewed PmHx, RxHx, FmHx, SocHx, AllgHx and updated and dated in the chart. Patient Active Problem List    Diagnosis    Chronic obstructive pulmonary disease ([facility]    Hypothyroidism    Prediabetes    Mixed hyperlipidemia    Insomnia       Review of Systems - negative except as listed above in the HPI    Objective:     Vitals:    11/10/20 0756   BP: (!) 146/94   Pulse: 64   Resp: 16   Temp: 98.8 °F (37.1 °C)   TempSrc: Oral   SpO2: 98%   Weight: 189 lb 9.6 oz (86 kg)   Height: 5' 11\" (1.803 m)     Physical Examination: General appearance - alert, well appearing, and in no distress  Chest - clear to auscultation, no wheezes, rales or rhonchi, symmetric air entry  Heart - normal rate, regular rhythm, normal S1, S2, no murmurs, rubs, clicks or gallops  Feet with bunions, toenail fungus and nodules on bottom    Assessment/ Plan:   Diagnoses and all orders for this visit:    1. Bilateral foot pain  -     REFERRAL TO PODIATRY  -nodules and bunions need to be addressed    2. Chronic obstructive pulmonary disease, unspecified COPD type (HCC)  -stable    3. Hypothyroidism, unspecified type  -     TSH 3RD GENERATION; Future    4. Prediabetes  -     METABOLIC PANEL, COMPREHENSIVE; Future  -     HEMOGLOBIN A1C WITH EAG; Future  Lab Results   Component Value Date/Time    Hemoglobin A1c 5.6 09/04/2019 07:59 AM       5. Mixed hyperlipidemia  -     LIPID PANEL;  Future  - METABOLIC PANEL, COMPREHENSIVE; Future  -     TSH 3RD GENERATION; Future    6. Fungal toenail infection  -     terbinafine HCL (LAMISIL) 250 mg tablet; Take 1 Tab by mouth daily.  -add new rx, trt for 3 months    7. Insomnia, unspecified type  -     eszopiclone (LUNESTA) 3 mg tablet; Take 1 Tab by mouth nightly. Max Daily Amount: 3 mg.  -dc ambien and add new rx    8. Elevated blood pressure:  -dwp inc bp, dec salt and check at home      9. Prostate cancer screening  -     PSA, DIAGNOSTIC (PROSTATE SPECIFIC AG); Future             I have discussed the diagnosis with the patient and the intended plan as seen in the above orders. The patient understands and agrees with the plan. The patient has received an after-visit summary and questions were answered concerning future plans. Medication Side Effects and Warnings were discussed with patient  Patient Labs were reviewed and or requested:  Patient Past Records were reviewed and or requested    Prem Del Valle M.D. There are no Patient Instructions on file for this visit.

## 2020-11-11 LAB
ALBUMIN SERPL-MCNC: 4.6 G/DL (ref 3.8–4.9)
ALBUMIN/GLOB SERPL: 1.8 {RATIO} (ref 1.2–2.2)
ALP SERPL-CCNC: 82 IU/L (ref 39–117)
ALT SERPL-CCNC: 10 IU/L (ref 0–44)
AST SERPL-CCNC: 19 IU/L (ref 0–40)
BILIRUB SERPL-MCNC: 0.5 MG/DL (ref 0–1.2)
BUN SERPL-MCNC: 11 MG/DL (ref 6–24)
BUN/CREAT SERPL: 10 (ref 9–20)
CALCIUM SERPL-MCNC: 9.6 MG/DL (ref 8.7–10.2)
CHLORIDE SERPL-SCNC: 102 MMOL/L (ref 96–106)
CHOLEST SERPL-MCNC: 193 MG/DL (ref 100–199)
CO2 SERPL-SCNC: 24 MMOL/L (ref 20–29)
CREAT SERPL-MCNC: 1.1 MG/DL (ref 0.76–1.27)
EST. AVERAGE GLUCOSE BLD GHB EST-MCNC: 114 MG/DL
GLOBULIN SER CALC-MCNC: 2.6 G/DL (ref 1.5–4.5)
GLUCOSE SERPL-MCNC: 86 MG/DL (ref 65–99)
HBA1C MFR BLD: 5.6 % (ref 4.8–5.6)
HDLC SERPL-MCNC: 60 MG/DL
LDLC SERPL CALC-MCNC: 123 MG/DL (ref 0–99)
POTASSIUM SERPL-SCNC: 4.3 MMOL/L (ref 3.5–5.2)
PROT SERPL-MCNC: 7.2 G/DL (ref 6–8.5)
PSA SERPL-MCNC: 0.7 NG/ML (ref 0–4)
SODIUM SERPL-SCNC: 139 MMOL/L (ref 134–144)
TRIGL SERPL-MCNC: 52 MG/DL (ref 0–149)
TSH SERPL DL<=0.005 MIU/L-ACNC: 3.1 UIU/ML (ref 0.45–4.5)
VLDLC SERPL CALC-MCNC: 10 MG/DL (ref 5–40)

## 2020-12-31 DIAGNOSIS — J44.9 CHRONIC OBSTRUCTIVE PULMONARY DISEASE, UNSPECIFIED COPD TYPE (HCC): ICD-10-CM

## 2020-12-31 DIAGNOSIS — J40 BRONCHITIS: ICD-10-CM

## 2020-12-31 RX ORDER — ALBUTEROL SULFATE 90 UG/1
1 AEROSOL, METERED RESPIRATORY (INHALATION)
Qty: 1 INHALER | Refills: 3 | Status: SHIPPED | OUTPATIENT
Start: 2020-12-31 | End: 2022-07-20 | Stop reason: SDUPTHER

## 2021-01-07 DIAGNOSIS — G47.00 INSOMNIA, UNSPECIFIED TYPE: ICD-10-CM

## 2021-01-07 RX ORDER — ESZOPICLONE 3 MG/1
3 TABLET, FILM COATED ORAL
Qty: 30 TAB | Refills: 1 | Status: SHIPPED | OUTPATIENT
Start: 2021-01-07 | End: 2021-03-15 | Stop reason: SDUPTHER

## 2021-01-18 DIAGNOSIS — E03.9 HYPOTHYROIDISM, UNSPECIFIED TYPE: ICD-10-CM

## 2021-01-18 RX ORDER — LEVOTHYROXINE SODIUM 50 UG/1
TABLET ORAL
Qty: 90 TAB | Refills: 1 | Status: SHIPPED | OUTPATIENT
Start: 2021-01-18 | End: 2021-03-15 | Stop reason: SDUPTHER

## 2021-02-08 DIAGNOSIS — B35.1 FUNGAL TOENAIL INFECTION: ICD-10-CM

## 2021-02-08 RX ORDER — TERBINAFINE HYDROCHLORIDE 250 MG/1
250 TABLET ORAL DAILY
Qty: 30 TAB | Refills: 2 | Status: SHIPPED | OUTPATIENT
Start: 2021-02-08 | End: 2021-07-20

## 2021-03-11 DIAGNOSIS — G47.00 INSOMNIA, UNSPECIFIED TYPE: ICD-10-CM

## 2021-03-11 RX ORDER — ESZOPICLONE 3 MG/1
3 TABLET, FILM COATED ORAL
Qty: 30 TAB | Refills: 1 | OUTPATIENT
Start: 2021-03-11

## 2021-03-15 DIAGNOSIS — E03.9 HYPOTHYROIDISM, UNSPECIFIED TYPE: ICD-10-CM

## 2021-03-15 DIAGNOSIS — G47.00 INSOMNIA, UNSPECIFIED TYPE: ICD-10-CM

## 2021-03-15 RX ORDER — LEVOTHYROXINE SODIUM 50 UG/1
TABLET ORAL
Qty: 90 TAB | Refills: 1 | Status: SHIPPED | OUTPATIENT
Start: 2021-03-15 | End: 2021-10-18

## 2021-03-15 RX ORDER — ESZOPICLONE 3 MG/1
3 TABLET, FILM COATED ORAL
Qty: 30 TAB | Refills: 1 | Status: SHIPPED | OUTPATIENT
Start: 2021-03-15 | End: 2021-05-11 | Stop reason: SDUPTHER

## 2021-03-15 NOTE — TELEPHONE ENCOUNTER
Lunesta & Synthroid needs refilling - pt states has had problems with med refills since last Friday CB# [phone].

## 2021-05-11 DIAGNOSIS — G47.00 INSOMNIA, UNSPECIFIED TYPE: ICD-10-CM

## 2021-05-11 RX ORDER — ESZOPICLONE 3 MG/1
3 TABLET, FILM COATED ORAL
Qty: 30 TAB | Refills: 1 | Status: SHIPPED | OUTPATIENT
Start: 2021-05-11 | End: 2021-07-12

## 2021-05-12 RX ORDER — CARBINOXAMINE MALEATE 4 MG/1
1 TABLET ORAL 2 TIMES DAILY
Qty: 60 TAB | Refills: 5 | Status: SHIPPED | OUTPATIENT
Start: 2021-05-12 | End: 2022-03-24 | Stop reason: SDUPTHER

## 2021-07-11 DIAGNOSIS — G47.00 INSOMNIA, UNSPECIFIED TYPE: ICD-10-CM

## 2021-07-12 RX ORDER — ESZOPICLONE 3 MG/1
TABLET, FILM COATED ORAL
Qty: 30 TABLET | Refills: 1 | Status: SHIPPED | OUTPATIENT
Start: 2021-07-12 | End: 2021-07-20

## 2021-07-12 NOTE — TELEPHONE ENCOUNTER
Patient states he took his last eszopriclone pill last night. Can you send rx to [facility]? He has an appt on 07/20/21 w/Dr Rodrigo Buerger.

## 2021-07-20 ENCOUNTER — OFFICE VISIT (OUTPATIENT)
Dept: FAMILY MEDICINE CLINIC | Age: 56
End: 2021-07-20
Payer: COMMERCIAL

## 2021-07-20 VITALS
RESPIRATION RATE: 16 BRPM | WEIGHT: 195 LBS | BODY MASS INDEX: 27.3 KG/M2 | OXYGEN SATURATION: 97 % | HEIGHT: 71 IN | HEART RATE: 60 BPM | DIASTOLIC BLOOD PRESSURE: 88 MMHG | TEMPERATURE: 98.3 F | SYSTOLIC BLOOD PRESSURE: 150 MMHG

## 2021-07-20 DIAGNOSIS — Z12.5 PROSTATE CANCER SCREENING: ICD-10-CM

## 2021-07-20 DIAGNOSIS — E78.2 MIXED HYPERLIPIDEMIA: ICD-10-CM

## 2021-07-20 DIAGNOSIS — R14.0 ABDOMINAL BLOATING: Primary | ICD-10-CM

## 2021-07-20 DIAGNOSIS — J44.9 CHRONIC OBSTRUCTIVE PULMONARY DISEASE, UNSPECIFIED COPD TYPE (HCC): ICD-10-CM

## 2021-07-20 DIAGNOSIS — E03.9 HYPOTHYROIDISM, UNSPECIFIED TYPE: ICD-10-CM

## 2021-07-20 DIAGNOSIS — R73.03 PREDIABETES: ICD-10-CM

## 2021-07-20 DIAGNOSIS — G47.00 INSOMNIA, UNSPECIFIED TYPE: ICD-10-CM

## 2021-07-20 PROCEDURE — 99214 OFFICE O/P EST MOD 30 MIN: CPT | Performed by: FAMILY MEDICINE

## 2021-07-20 RX ORDER — TRAZODONE HYDROCHLORIDE 50 MG/1
50 TABLET ORAL
Qty: 30 TABLET | Refills: 3 | Status: SHIPPED | OUTPATIENT
Start: 2021-07-20 | End: 2021-08-23

## 2021-07-20 RX ORDER — DICLOFENAC SODIUM 75 MG/1
1 TABLET, DELAYED RELEASE ORAL 2 TIMES DAILY
COMMUNITY
Start: 2021-07-15

## 2021-07-20 NOTE — PROGRESS NOTES
Chief Complaint   Patient presents with    Weight Gain    Breathing Problem    Shoulder Pain     Left shoulder and hand swelling     1. Have you been to the ER, urgent care clinic since your last visit? Hospitalized since your last visit? No    2. Have you seen or consulted any other health care providers outside of the [address] [place] [address] since your last visit? Include any pap smears or colon screening. No         Chief Complaint   Patient presents with    Weight Gain    Breathing Problem    Shoulder Pain     Left shoulder and hand swelling     He is a 60 y.o. male who presents for evalution. Reviewed PmHx, RxHx, FmHx, SocHx, AllgHx and updated and dated in the chart. Patient Active Problem List    Diagnosis    Chronic obstructive pulmonary disease ([facility]    Hypothyroidism    Prediabetes    Mixed hyperlipidemia    Insomnia       Review of Systems - negative except as listed above in the HPI    Objective:     Vitals:    07/20/21 1533   BP: (!) 150/88   Pulse: 60   Resp: 16   Temp: 98.3 °F (36.8 °C)   TempSrc: Oral   SpO2: 97%   Weight: 195 lb (88.5 kg)   Height: 5' 11\" (1.803 m)         Assessment/ Plan:   Diagnoses and all orders for this visit:    1. Abdominal bloating  -     METABOLIC PANEL, COMPREHENSIVE; Future  -     LIPID PANEL; Future  -     CBC WITH AUTOMATED DIFF; Future  -     TSH 3RD GENERATION; Future  -     HEMOGLOBIN A1C WITH EAG; Future  -     REFERRAL TO GASTROENTEROLOGY  ---sxs for 2 years and getting worse and now with mod bowel changes  --needs scope and work up    2. Chronic obstructive pulmonary disease, unspecified COPD type ([facility]  -dwp smoking    3. Hypothyroidism, unspecified type  -     TSH 3RD GENERATION; Future    4. Prediabetes  -     METABOLIC PANEL, COMPREHENSIVE; Future  -     HEMOGLOBIN A1C WITH EAG; Future    5. Mixed hyperlipidemia  -     METABOLIC PANEL, COMPREHENSIVE; Future  -     LIPID PANEL; Future    6.  Prostate cancer screening  -     PSA W/ REFLX FREE PSA; Future    7. Insomnia, unspecified type  -     traZODone (DESYREL) 50 mg tablet; Take 1 Tablet by mouth nightly.  -dc lunesta and add new rx             I have discussed the diagnosis with the patient and the intended plan as seen in the above orders. The patient understands and agrees with the plan. The patient has received an after-visit summary and questions were answered concerning future plans. Medication Side Effects and Warnings were discussed with patient  Patient Labs were reviewed and or requested:  Patient Past Records were reviewed and or requested    Elsy Mireles M.D. There are no Patient Instructions on file for this visit.

## 2021-07-22 LAB
ALBUMIN SERPL-MCNC: 4.7 G/DL (ref 3.8–4.9)
ALBUMIN/GLOB SERPL: 2 {RATIO} (ref 1.2–2.2)
ALP SERPL-CCNC: 98 IU/L (ref 48–121)
ALT SERPL-CCNC: 19 IU/L (ref 0–44)
AST SERPL-CCNC: 21 IU/L (ref 0–40)
BASOPHILS # BLD AUTO: 0.1 X10E3/UL (ref 0–0.2)
BASOPHILS NFR BLD AUTO: 1 %
BILIRUB SERPL-MCNC: 0.2 MG/DL (ref 0–1.2)
BUN SERPL-MCNC: 10 MG/DL (ref 6–24)
BUN/CREAT SERPL: 10 (ref 9–20)
CALCIUM SERPL-MCNC: 9.4 MG/DL (ref 8.7–10.2)
CHLORIDE SERPL-SCNC: 104 MMOL/L (ref 96–106)
CHOLEST SERPL-MCNC: 199 MG/DL (ref 100–199)
CO2 SERPL-SCNC: 25 MMOL/L (ref 20–29)
CREAT SERPL-MCNC: 1 MG/DL (ref 0.76–1.27)
EOSINOPHIL # BLD AUTO: 0.5 X10E3/UL (ref 0–0.4)
EOSINOPHIL NFR BLD AUTO: 9 %
ERYTHROCYTE [DISTWIDTH] IN BLOOD BY AUTOMATED COUNT: 11.9 % (ref 11.6–15.4)
EST. AVERAGE GLUCOSE BLD GHB EST-MCNC: 117 MG/DL
GLOBULIN SER CALC-MCNC: 2.3 G/DL (ref 1.5–4.5)
GLUCOSE SERPL-MCNC: 97 MG/DL (ref 65–99)
HBA1C MFR BLD: 5.7 % (ref 4.8–5.6)
HCT VFR BLD AUTO: 43.1 % (ref 37.5–51)
HDLC SERPL-MCNC: 49 MG/DL
HGB BLD-MCNC: 14.9 G/DL (ref 13–17.7)
IMM GRANULOCYTES # BLD AUTO: 0 X10E3/UL (ref 0–0.1)
IMM GRANULOCYTES NFR BLD AUTO: 0 %
IMP & REVIEW OF LAB RESULTS: NORMAL
LDLC SERPL CALC-MCNC: 132 MG/DL (ref 0–99)
LYMPHOCYTES # BLD AUTO: 2.2 X10E3/UL (ref 0.7–3.1)
LYMPHOCYTES NFR BLD AUTO: 42 %
MCH RBC QN AUTO: 31.4 PG (ref 26.6–33)
MCHC RBC AUTO-ENTMCNC: 34.6 G/DL (ref 31.5–35.7)
MCV RBC AUTO: 91 FL (ref 79–97)
MONOCYTES # BLD AUTO: 0.4 X10E3/UL (ref 0.1–0.9)
MONOCYTES NFR BLD AUTO: 8 %
NEUTROPHILS # BLD AUTO: 2.1 X10E3/UL (ref 1.4–7)
NEUTROPHILS NFR BLD AUTO: 40 %
PLATELET # BLD AUTO: 283 X10E3/UL (ref 150–450)
POTASSIUM SERPL-SCNC: 4.7 MMOL/L (ref 3.5–5.2)
PROT SERPL-MCNC: 7 G/DL (ref 6–8.5)
RBC # BLD AUTO: 4.74 X10E6/UL (ref 4.14–5.8)
SODIUM SERPL-SCNC: 142 MMOL/L (ref 134–144)
SPECIMEN STATUS REPORT, ROLRST: NORMAL
TRIGL SERPL-MCNC: 102 MG/DL (ref 0–149)
TSH SERPL DL<=0.005 MIU/L-ACNC: 2.03 UIU/ML (ref 0.45–4.5)
VLDLC SERPL CALC-MCNC: 18 MG/DL (ref 5–40)
WBC # BLD AUTO: 5.2 X10E3/UL (ref 3.4–10.8)

## 2021-07-22 NOTE — PROGRESS NOTES
I have had the opportunity to review your labs and am pleased to tell you that they look very good, and I have no health concerns for you. If you have any questions, please feel free to send me a Ev[employer] message. Dr. Linsey Nunez M.D.   \"You May Be Whatever You Resolve To Be\"

## 2021-07-23 LAB
PSA SERPL-MCNC: 1.1 NG/ML (ref 0–4)
REFLEX CRITERIA: NORMAL
SPECIMEN STATUS REPORT, ROLRST: NORMAL

## 2021-07-27 NOTE — PROGRESS NOTES
Please contact patient regarding their mychart resulted labs. They have not reviewed them to date.       Dr. Pavel Victor

## 2021-08-22 DIAGNOSIS — G47.00 INSOMNIA, UNSPECIFIED TYPE: ICD-10-CM

## 2021-08-23 RX ORDER — TRAZODONE HYDROCHLORIDE 50 MG/1
TABLET ORAL
Qty: 30 TABLET | Refills: 3 | Status: SHIPPED | OUTPATIENT
Start: 2021-08-23 | End: 2021-11-29 | Stop reason: SDUPTHER

## 2021-10-16 DIAGNOSIS — E03.9 HYPOTHYROIDISM, UNSPECIFIED TYPE: ICD-10-CM

## 2021-10-18 RX ORDER — LEVOTHYROXINE SODIUM 50 UG/1
TABLET ORAL
Qty: 90 TABLET | Refills: 1 | Status: SHIPPED | OUTPATIENT
Start: 2021-10-18 | End: 2022-07-11

## 2021-11-16 ENCOUNTER — APPOINTMENT (OUTPATIENT)
Dept: CT IMAGING | Age: 56
End: 2021-11-16
Attending: EMERGENCY MEDICINE
Payer: COMMERCIAL

## 2021-11-16 ENCOUNTER — HOSPITAL ENCOUNTER (EMERGENCY)
Age: 56
Discharge: HOME | End: 2021-11-16
Attending: EMERGENCY MEDICINE
Payer: COMMERCIAL

## 2021-11-16 VITALS
RESPIRATION RATE: 18 BRPM | DIASTOLIC BLOOD PRESSURE: 83 MMHG | HEART RATE: 54 BPM | BODY MASS INDEX: 27.77 KG/M2 | TEMPERATURE: 98.5 F | WEIGHT: 194 LBS | HEIGHT: 70 IN | OXYGEN SATURATION: 96 % | SYSTOLIC BLOOD PRESSURE: 134 MMHG

## 2021-11-16 DIAGNOSIS — R50.9 FEVER IN ADULT: Primary | ICD-10-CM

## 2021-11-16 DIAGNOSIS — R59.0 CERVICAL LYMPHADENOPATHY: ICD-10-CM

## 2021-11-16 LAB
ALBUMIN SERPL-MCNC: 3.5 G/DL (ref 3.5–5)
ALBUMIN/GLOB SERPL: 0.8 {RATIO} (ref 1.1–2.2)
ALP SERPL-CCNC: 95 U/L (ref 45–117)
ALT SERPL-CCNC: 21 U/L (ref 12–78)
ANION GAP SERPL CALC-SCNC: 10 MMOL/L (ref 5–15)
APAP SERPL-MCNC: <10 UG/ML (ref 10–30)
AST SERPL W P-5'-P-CCNC: 11 U/L (ref 15–37)
BASOPHILS # BLD: 0 K/UL (ref 0–0.1)
BASOPHILS NFR BLD: 1 % (ref 0–1)
BILIRUB SERPL-MCNC: 0.4 MG/DL (ref 0.2–1)
BUN SERPL-MCNC: 9 MG/DL (ref 6–20)
BUN/CREAT SERPL: 7 (ref 12–20)
CA-I BLD-MCNC: 8.8 MG/DL (ref 8.5–10.1)
CHLORIDE SERPL-SCNC: 100 MMOL/L (ref 97–108)
CO2 SERPL-SCNC: 27 MMOL/L (ref 21–32)
CREAT SERPL-MCNC: 1.23 MG/DL (ref 0.7–1.3)
DATE LAST DOSE: ABNORMAL
DIFFERENTIAL METHOD BLD: NORMAL
EOSINOPHIL # BLD: 0.2 K/UL (ref 0–0.4)
EOSINOPHIL NFR BLD: 2 % (ref 0–7)
ERYTHROCYTE [DISTWIDTH] IN BLOOD BY AUTOMATED COUNT: 12.2 % (ref 11.5–14.5)
GLOBULIN SER CALC-MCNC: 4.4 G/DL (ref 2–4)
GLUCOSE SERPL-MCNC: 96 MG/DL (ref 65–100)
HCT VFR BLD AUTO: 43.5 % (ref 36.6–50.3)
HGB BLD-MCNC: 14.3 G/DL (ref 12.1–17)
IMM GRANULOCYTES # BLD AUTO: 0 K/UL (ref 0–0.04)
IMM GRANULOCYTES NFR BLD AUTO: 0 % (ref 0–0.5)
LYMPHOCYTES # BLD: 1.6 K/UL (ref 0.8–3.5)
LYMPHOCYTES NFR BLD: 19 % (ref 12–49)
MCH RBC QN AUTO: 31.8 PG (ref 26–34)
MCHC RBC AUTO-ENTMCNC: 32.9 G/DL (ref 30–36.5)
MCV RBC AUTO: 96.7 FL (ref 80–99)
MONOCYTES # BLD: 0.6 K/UL (ref 0–1)
MONOCYTES NFR BLD: 8 % (ref 5–13)
NEUTS SEG # BLD: 5.8 K/UL (ref 1.8–8)
NEUTS SEG NFR BLD: 70 % (ref 32–75)
PLATELET # BLD AUTO: 267 K/UL (ref 150–400)
PMV BLD AUTO: 9 FL (ref 8.9–12.9)
POTASSIUM SERPL-SCNC: 4.1 MMOL/L (ref 3.5–5.1)
PROT SERPL-MCNC: 7.9 G/DL (ref 6.4–8.2)
RBC # BLD AUTO: 4.5 M/UL (ref 4.1–5.7)
REPORTED DOSE,DOSE: ABNORMAL UNITS
SODIUM SERPL-SCNC: 137 MMOL/L (ref 136–145)
WBC # BLD AUTO: 8.2 K/UL (ref 4.1–11.1)

## 2021-11-16 PROCEDURE — 74011250636 HC RX REV CODE- 250/636: Performed by: EMERGENCY MEDICINE

## 2021-11-16 PROCEDURE — 74011250637 HC RX REV CODE- 250/637: Performed by: EMERGENCY MEDICINE

## 2021-11-16 PROCEDURE — 74011000636 HC RX REV CODE- 636: Performed by: EMERGENCY MEDICINE

## 2021-11-16 PROCEDURE — 36415 COLL VENOUS BLD VENIPUNCTURE: CPT

## 2021-11-16 PROCEDURE — 70491 CT SOFT TISSUE NECK W/DYE: CPT

## 2021-11-16 PROCEDURE — 80053 COMPREHEN METABOLIC PANEL: CPT

## 2021-11-16 PROCEDURE — 85025 COMPLETE CBC W/AUTO DIFF WBC: CPT

## 2021-11-16 PROCEDURE — 80143 DRUG ASSAY ACETAMINOPHEN: CPT

## 2021-11-16 PROCEDURE — 70450 CT HEAD/BRAIN W/O DYE: CPT

## 2021-11-16 PROCEDURE — 87040 BLOOD CULTURE FOR BACTERIA: CPT

## 2021-11-16 PROCEDURE — 99283 EMERGENCY DEPT VISIT LOW MDM: CPT

## 2021-11-16 RX ORDER — GABAPENTIN 100 MG/1
CAPSULE ORAL 3 TIMES DAILY
COMMUNITY
End: 2022-10-28

## 2021-11-16 RX ORDER — IBUPROFEN 800 MG/1
800 TABLET ORAL ONCE
Status: COMPLETED | OUTPATIENT
Start: 2021-11-16 | End: 2021-11-16

## 2021-11-16 RX ORDER — TIZANIDINE HYDROCHLORIDE 2 MG/1
2 CAPSULE, GELATIN COATED ORAL 3 TIMES DAILY
COMMUNITY
End: 2022-10-28

## 2021-11-16 RX ADMIN — SODIUM CHLORIDE 1000 ML: 9 INJECTION, SOLUTION INTRAVENOUS at 10:56

## 2021-11-16 RX ADMIN — IOPAMIDOL 100 ML: 755 INJECTION, SOLUTION INTRAVENOUS at 13:00

## 2021-11-16 RX ADMIN — IBUPROFEN 800 MG: 800 TABLET, FILM COATED ORAL at 10:56

## 2021-11-16 NOTE — Clinical Note
[address] [facility] EMERGENCY DEPARTMENT  [address] [address]  [phone]    Work/School Note    Date: 11/16/2021     To Whom It May concern:    Vickey Goldberg was evaluated by the following provider(s):  Attending Provider: Symone Ponce [address] [place] [address] virus is suspected. Per the CDC guidelines we recommend home isolation until the following conditions are all met:    1. At least 10 days have passed since symptoms first appeared and  2. At least 24 hours have passed since last fever without the use of fever-reducing medications and  3.  Symptoms (e.g., cough, shortness of breath) have improved      Sincerely,          Samson Gant MD

## 2021-11-16 NOTE — ED PROVIDER NOTES
EMERGENCY DEPARTMENT HISTORY AND PHYSICAL EXAM      Date: 11/16/2021  Patient Name: Jah Gaytan      History of Presenting Illness     Chief Complaint   Patient presents with    Fever       History Provided By: Patient    HPI: Jah Gaytan, 60 y.o. male with a past medical history significant COPD presents to the ED with cc of fever. Patient states that he has had intermittent fevers for the last 2 weeks. Last week he had 3 days of fever with a T-max of 102. He is taking Tylenol and ibuprofen however he does state that he was taking more Tylenol than was prescribed on the bottle with some relief. He endorses pain \"from my collarbones up\". Main pain is in the swollen nodes in his neck. He endorses one episode of dizziness and runny nose so he states he always has a runny nose but this is not a change for him. He also endorses sinus pressure and headache. He denies red flags of headache. No confusion. He initially states that he continued to have generalized malaise he was afebrile until last night when the fever returned. Denies any nausea, vomiting, chest pain, cough, shortness of breath or sore throat. There are no other complaints, changes, or physical findings at this time. PCP: Miriam Alan NP    Current Outpatient Medications   Medication Sig Dispense Refill    tiZANidine (ZANAFLEX) 2 mg capsule Take 2 mg by mouth three (3) times daily.  gabapentin (NEURONTIN) 100 mg capsule Take  by mouth three (3) times daily.  levothyroxine (SYNTHROID) 50 mcg tablet TAKE 1 TABLET BY MOUTH EVERY DAY BEFORE BREAKFAST 90 Tablet 1    traZODone (DESYREL) 50 mg tablet TAKE 1 TABLET BY MOUTH EVERY NIGHT 30 Tablet 3    diclofenac EC (VOLTAREN) 75 mg EC tablet Take 1 Tablet by mouth two (2) times a day.  carbinoxamine maleate 4 mg tab Take 1 Tab by mouth two (2) times a day.  Indications: allergic conjunctivitis 60 Tab 5    albuterol (ProAir HFA) 90 mcg/actuation inhaler Take 1 Puff by inhalation every four (4) hours as needed for Wheezing or Shortness of Breath. 1 Inhaler 3    albuterol (PROVENTIL VENTOLIN) 2.5 mg /3 mL (0.083 %) nebu Take 3 mL by inhalation every four (4) hours as needed for Wheezing. 61 Nebule 3       Past History     Past Medical History:  Past Medical History:   Diagnosis Date    Chronic obstructive pulmonary disease ([address]     Shoulder disorder        Past Surgical History:  Past Surgical History:   Procedure Laterality Date    HX HERNIA REPAIR      x 2        Family History:  Family History   Problem Relation Age of Onset    Alcohol abuse Father        Social History:  Social History     Tobacco Use    Smoking status: Former Smoker    Smokeless tobacco: Former User   Substance Use Topics    Alcohol use: Yes     Comment: rare     Drug use: No       Allergies: Allergies   Allergen Reactions    Hydrocodone Nausea and Vomiting    Voltaren [Diclofenac Sodium] Other (comments)     Stomach cramps         Review of Systems     Review of Systems   Constitutional: Positive for fatigue and fever. Negative for activity change and appetite change. HENT: Positive for rhinorrhea. Negative for sore throat. Eyes: Negative for visual disturbance. Respiratory: Negative for cough and shortness of breath. Cardiovascular: Negative for chest pain. Gastrointestinal: Negative for abdominal pain, diarrhea, nausea and vomiting. Genitourinary: Negative for dysuria. Musculoskeletal: Negative for arthralgias and myalgias. Skin: Negative for rash. Neurological: Negative for headaches. Psychiatric/Behavioral: Negative for confusion. All other systems reviewed and are negative. Physical Exam     Physical Exam  Vitals and nursing note reviewed. Constitutional:       General: He is not in acute distress. Appearance: Normal appearance. HENT:      Head: Normocephalic and atraumatic. Nose: Nose normal. No congestion.       Mouth/Throat:      Pharynx: No oropharyngeal exudate or posterior oropharyngeal erythema. Comments: No trismus, normal voice. Eyes:      Pupils: Pupils are equal, round, and reactive to light. Cardiovascular:      Rate and Rhythm: Normal rate and regular rhythm. Pulses: Normal pulses. Pulmonary:      Effort: Pulmonary effort is normal.   Musculoskeletal:         General: No swelling or deformity. Right lower leg: No edema. Left lower leg: No edema. Lymphadenopathy:      Cervical: Cervical adenopathy (Right greater than left, nonmobile) present. Skin:     Coloration: Skin is not pale. Findings: No rash. Neurological:      General: No focal deficit present. Mental Status: He is alert. Psychiatric:         Mood and Affect: Mood normal.         Behavior: Behavior normal.         Lab and Diagnostic Study Results     Labs -     Recent Results (from the past 12 hour(s))   CBC WITH AUTOMATED DIFF    Collection Time: 11/16/21 10:50 AM   Result Value Ref Range    WBC 8.2 4.1 - 11.1 K/uL    RBC 4.50 4. 10 - 5.70 M/uL    HGB 14.3 12.1 - 17.0 g/dL    HCT 43.5 36.6 - 50.3 %    MCV 96.7 80.0 - 99.0 FL    MCH 31.8 26.0 - 34.0 PG    MCHC 32.9 30.0 - 36.5 g/dL    RDW 12.2 11.5 - 14.5 %    PLATELET 617 002 - 781 K/uL    MPV 9.0 8.9 - 12.9 FL    NEUTROPHILS 70 32 - 75 %    LYMPHOCYTES 19 12 - 49 %    MONOCYTES 8 5 - 13 %    EOSINOPHILS 2 0 - 7 %    BASOPHILS 1 0 - 1 %    IMMATURE GRANULOCYTES 0 0.0 - 0.5 %    ABS. NEUTROPHILS 5.8 1.8 - 8.0 K/UL    ABS. LYMPHOCYTES 1.6 0.8 - 3.5 K/UL    ABS. MONOCYTES 0.6 0.0 - 1.0 K/UL    ABS. EOSINOPHILS 0.2 0.0 - 0.4 K/UL    ABS. BASOPHILS 0.0 0.0 - 0.1 K/UL    ABS. IMM.  GRANS. 0.0 0.00 - 0.04 K/UL    DF AUTOMATED     METABOLIC PANEL, COMPREHENSIVE    Collection Time: 11/16/21 10:50 AM   Result Value Ref Range    Sodium 137 136 - 145 mmol/L    Potassium 4.1 3.5 - 5.1 mmol/L    Chloride 100 97 - 108 mmol/L    CO2 27 21 - 32 mmol/L    Anion gap 10 5 - 15 mmol/L    Glucose 96 65 - 100 mg/dL    BUN 9 6 - 20 mg/dL    Creatinine 1.23 0.70 - 1.30 mg/dL    BUN/Creatinine ratio 7 (L) 12 - 20      GFR est AA >60 >60 ml/min/1.73m2    GFR est non-AA >60 >60 ml/min/1.73m2    Calcium 8.8 8.5 - 10.1 mg/dL    Bilirubin, total 0.4 0.2 - 1.0 mg/dL    AST (SGOT) 11 (L) 15 - 37 U/L    ALT (SGPT) 21 12 - 78 U/L    Alk. phosphatase 95 45 - 117 U/L    Protein, total 7.9 6.4 - 8.2 g/dL    Albumin 3.5 3.5 - 5.0 g/dL    Globulin 4.4 (H) 2.0 - 4.0 g/dL    A-G Ratio 0.8 (L) 1.1 - 2.2     ACETAMINOPHEN    Collection Time: 11/16/21 11:22 AM   Result Value Ref Range    Acetaminophen level <10 (L) 10 - 30 ug/mL    Reported dose date Not provided      Reported dose: Not provided Units       Radiologic Studies -   [unfilled]  CT Results  (Last 48 hours)               11/16/21 1220  CT NECK SOFT TISSUE W CONT Final result    Impression:  Bilateral lymphadenopathy as described above. Differential diagnosis   and suggestions as above. Narrative:  CT SCAN OF THE NECK WITH INTRAVENOUS CONTRAST:       All CT scans at this facility are performed using dose reduction optimization   techniques as appropriate to a performed exam including the following: Automated   exposure control, adjustments of the mA and/or kV according to patient size, or   use of iterative reconstruction technique. CLINICAL HISTORY: Lymphadenopathy. Thin axial and coronal and sagittal reconstructions were obtained after the   administration of intravenous contrast. 100 mL of Isovue-370 were injected   intravenously. Scans  through the nasopharynx, oropharynx, oral cavity, hypopharynx and larynx   showed no CT abnormality. Thyroid gland have a normal CT appearance. Parotid and   submandibular glands have a normal CT appearance. There are prominent enhancing   lymph nodes along the internal jugular chains bilaterally. On the right largest   lymph node is located in the right jugulodigastric region (level 2A) and   measures 2.1 cm. A level 5A lymph node/supraclavicular lymph node on the right   measures 1.5 cm. Several smaller lymph nodes measuring 1 cm  or less in the   supraclavicular region are noted. On the left largest lymph node measures 2 cm   and located in the left jugulodigastric region (level 2A). Several smaller lymph   nodes measuring 1 cm or less are also noted. No necrotic changes within the   lymph nodes are noted. No extracapsular extension is noted. Differential   diagnoses includes reactive lymph nodes although lymphoproliferative disease or   other inflammatory or neoplastic etiologies cannot be entirely excluded and   follow-up to resolution is suggested. No pathologic sclerotic or lytic bone   lesion was identified. Both orbits have a normal CT appearance. Paranasal sinuses are clear. Mastoid air cells are clear. Visualized intracranial structures have a normal CT appearance. 11/16/21 1219  CT HEAD WO CONT Final result    Impression:  Chronic lacunar infarct right thalamus. No acute intracranial abnormality. Narrative:  CT SCAN OF THE BRAIN WITHOUT CONTRAST:               CLINICAL HISTORY: Headaches. Thin axial and coronal and sagittal reconstructions were obtained. All CT scans at this facility are performed using dose reduction optimization   techniques as appropriate to a performed exam including the following: Automated   exposure control, adjustments of the mA and/or kV according to patient size, or   use of iterative reconstruction technique. Ventricles are midline and of normal size. A tiny chronic lacunar infarct in the   right thalamus is noted. No intra or extra-axial hemorrhage, mass or acute   infarction in a major arterial distribution is demonstrated. Prominent pineal   calcifications are noted. Pituitary gland is of normal size               Scans through the posterior fossa show no major infarction, mass or hemorrhage. The cerebellar tonsils are 3 mm below the foramen magnum that is within the   upper limits of normal. No obstruction of the CSF spaces posteriorly to the   cerebellar tonsils is noted. Visualized paranasal sinuses are clear. Mastoid air cells are clear. Both orbits have a normal CT appearance. CXR Results  (Last 48 hours)    None          Medical Decision Making and ED Course   - I am the first and primary provider for this patient AND AM THE PRIMARY PROVIDER OF RECORD. - I reviewed the vital signs, available nursing notes, past medical history, past surgical history, family history and social history. - Initial assessment performed. The patients presenting problems have been discussed, and the staff are in agreement with the care plan formulated and outlined with them. I have encouraged them to ask questions as they arise throughout their visit. Vital Signs-Reviewed the patient's vital signs. Patient Vitals for the past 12 hrs:   Temp Pulse Resp BP SpO2   11/16/21 1319 98.5 °F (36.9 °C) (!) 54 18 134/83 96 %   11/16/21 1001 98.3 °F (36.8 °C) 75 16 (!) 141/87 96 %       Records Reviewed: Nursing Notes and Old Medical Records        ED Course:       ED Course as of 11/16/21 1334   Tue Nov 16, 2021   [age]-year-old male presents for evaluation of fevers. States last week he had 3 days of significant fever, lymphadenopathy and rhinorrhea. The rhinorrhea is baseline. He took ibuprofen and Tylenol but states he was taking more than the bottle said that he should take. This resolved however returned yesterday evening. He has high fevers with a T-max of greater than 102. On exam he is well-appearing however he does have significant lymphadenopathy in the right. Normal voice and no trismus. No meningismus on exam.  Does endorse headache however has a normal neurologic exam.  No red flags of headache.   Differential diagnosis includes chronic sinusitis versus lymphadenopathy versus malignancy versus viral syndrome. Patient reports taking high doses of Tylenol 1 week ago, will check a CMP and APAP level there is no self-harm or intentional overdose so patient was educated on safe amount of medication to take and risks of taking too much. Getting labs including a CBC, CMP, blood cultures and a CT of the head and soft tissue neck. Giving ibuprofen and fluids. Disposition as per clinical course. [LW]   1332 Patient informed of results including importance of follow-up for resolution to rule out malignancy. Discussed symptomatic care for fever, return precautions. Patient to follow-up with his primary care physician. Discharged home. [LW]      ED Course User Index  [LW] Chiki Martinez MD           Disposition     Disposition: DC- Adult Discharges: All of the diagnostic tests were reviewed and questions answered. Diagnosis, care plan and treatment options were discussed. The patient understands the instructions and will follow up as directed. The patients results have been reviewed with them. They have been counseled regarding their diagnosis. The patient verbally convey understanding and agreement of the signs, symptoms, diagnosis, treatment and prognosis and additionally agrees to follow up as recommended with their PCP in 24 - 48 hours. They also agree with the care-plan and convey that all of their questions have been answered. I have also put together some discharge instructions for them that include: 1) educational information regarding their diagnosis, 2) how to care for their diagnosis at home, as well a 3) list of reasons why they would want to return to the ED prior to their follow-up appointment, should their condition change. Remove if not discharged  DISCHARGE PLAN:  1. Current Discharge Medication List      CONTINUE these medications which have NOT CHANGED    Details   tiZANidine (ZANAFLEX) 2 mg capsule Take 2 mg by mouth three (3) times daily. gabapentin (NEURONTIN) 100 mg capsule Take  by mouth three (3) times daily. levothyroxine (SYNTHROID) 50 mcg tablet TAKE 1 TABLET BY MOUTH EVERY DAY BEFORE BREAKFAST  Qty: 90 Tablet, Refills: 1    Associated Diagnoses: Hypothyroidism, unspecified type      traZODone (DESYREL) 50 mg tablet TAKE 1 TABLET BY MOUTH EVERY NIGHT  Qty: 30 Tablet, Refills: 3    Associated Diagnoses: Insomnia, unspecified type      diclofenac EC (VOLTAREN) 75 mg EC tablet Take 1 Tablet by mouth two (2) times a day. carbinoxamine maleate 4 mg tab Take 1 Tab by mouth two (2) times a day. Indications: allergic conjunctivitis  Qty: 60 Tab, Refills: 5      albuterol (ProAir HFA) 90 mcg/actuation inhaler Take 1 Puff by inhalation every four (4) hours as needed for Wheezing or Shortness of Breath. Qty: 1 Inhaler, Refills: 3    Associated Diagnoses: Chronic obstructive pulmonary disease, unspecified COPD type ([address] Bronchitis      albuterol (PROVENTIL VENTOLIN) 2.5 mg /3 mL (0.083 %) nebu Take 3 mL by inhalation every four (4) hours as needed for Wheezing. Qty: 60 Nebule, Refills: 3    Associated Diagnoses: Chronic obstructive pulmonary disease, unspecified COPD type ([address] Bronchitis           2. Follow-up Information     Follow up With Specialties Details Why Contact Info    Balaji Borjas NP Family Medicine In 1 week  [address]  [phone]          3. Return to ED if worse   4. Current Discharge Medication List          Diagnosis     Clinical Impression:   1. Fever in adult    2. Cervical lymphadenopathy        Attestations:    Bryon Flores MD    Please note that this dictation was completed with [employer], the computer voice recognition software. Quite often unanticipated grammatical, syntax, homophones, and other interpretive errors are inadvertently transcribed by the computer software. Please disregard these errors.   Please excuse any errors that have escaped final proofreading. Thank you.

## 2021-11-16 NOTE — DISCHARGE INSTRUCTIONS
Thank you! Thank you for allowing me to care for you in the emergency department. I sincerely hope that you are satisfied with your visit today. It is my goal to provide you with excellent care. Below you will find a list of your labs and imaging from your visit today. Should you have any questions regarding these results please do not hesitate to call the emergency department. Labs -     Recent Results (from the past 12 hour(s))   CBC WITH AUTOMATED DIFF    Collection Time: 11/16/21 10:50 AM   Result Value Ref Range    WBC 8.2 4.1 - 11.1 K/uL    RBC 4.50 4. 10 - 5.70 M/uL    HGB 14.3 12.1 - 17.0 g/dL    HCT 43.5 36.6 - 50.3 %    MCV 96.7 80.0 - 99.0 FL    MCH 31.8 26.0 - 34.0 PG    MCHC 32.9 30.0 - 36.5 g/dL    RDW 12.2 11.5 - 14.5 %    PLATELET 397 854 - 394 K/uL    MPV 9.0 8.9 - 12.9 FL    NEUTROPHILS 70 32 - 75 %    LYMPHOCYTES 19 12 - 49 %    MONOCYTES 8 5 - 13 %    EOSINOPHILS 2 0 - 7 %    BASOPHILS 1 0 - 1 %    IMMATURE GRANULOCYTES 0 0.0 - 0.5 %    ABS. NEUTROPHILS 5.8 1.8 - 8.0 K/UL    ABS. LYMPHOCYTES 1.6 0.8 - 3.5 K/UL    ABS. MONOCYTES 0.6 0.0 - 1.0 K/UL    ABS. EOSINOPHILS 0.2 0.0 - 0.4 K/UL    ABS. BASOPHILS 0.0 0.0 - 0.1 K/UL    ABS. IMM. GRANS. 0.0 0.00 - 0.04 K/UL    DF AUTOMATED     METABOLIC PANEL, COMPREHENSIVE    Collection Time: 11/16/21 10:50 AM   Result Value Ref Range    Sodium 137 136 - 145 mmol/L    Potassium 4.1 3.5 - 5.1 mmol/L    Chloride 100 97 - 108 mmol/L    CO2 27 21 - 32 mmol/L    Anion gap 10 5 - 15 mmol/L    Glucose 96 65 - 100 mg/dL    BUN 9 6 - 20 mg/dL    Creatinine 1.23 0.70 - 1.30 mg/dL    BUN/Creatinine ratio 7 (L) 12 - 20      GFR est AA >60 >60 ml/min/1.73m2    GFR est non-AA >60 >60 ml/min/1.73m2    Calcium 8.8 8.5 - 10.1 mg/dL    Bilirubin, total 0.4 0.2 - 1.0 mg/dL    AST (SGOT) 11 (L) 15 - 37 U/L    ALT (SGPT) 21 12 - 78 U/L    Alk.  phosphatase 95 45 - 117 U/L    Protein, total 7.9 6.4 - 8.2 g/dL    Albumin 3.5 3.5 - 5.0 g/dL    Globulin 4.4 (H) 2.0 - 4.0 g/dL    A-G Ratio 0.8 (L) 1.1 - 2.2     ACETAMINOPHEN    Collection Time: 11/16/21 11:22 AM   Result Value Ref Range    Acetaminophen level <10 (L) 10 - 30 ug/mL    Reported dose date Not provided      Reported dose: Not provided Units       Radiologic Studies -   CT NECK SOFT TISSUE W CONT   Final Result   Bilateral lymphadenopathy as described above. Differential diagnosis   and suggestions as above. CT HEAD WO CONT   Final Result   Chronic lacunar infarct right thalamus. No acute intracranial abnormality. CT Results  (Last 48 hours)                 11/16/21 1220  CT NECK SOFT TISSUE W CONT Final result    Impression:  Bilateral lymphadenopathy as described above. Differential diagnosis   and suggestions as above. Narrative:  CT SCAN OF THE NECK WITH INTRAVENOUS CONTRAST:       All CT scans at this facility are performed using dose reduction optimization   techniques as appropriate to a performed exam including the following: Automated   exposure control, adjustments of the mA and/or kV according to patient size, or   use of iterative reconstruction technique. CLINICAL HISTORY: Lymphadenopathy. Thin axial and coronal and sagittal reconstructions were obtained after the   administration of intravenous contrast. 100 mL of Isovue-370 were injected   intravenously. Scans  through the nasopharynx, oropharynx, oral cavity, hypopharynx and larynx   showed no CT abnormality. Thyroid gland have a normal CT appearance. Parotid and   submandibular glands have a normal CT appearance. There are prominent enhancing   lymph nodes along the internal jugular chains bilaterally. On the right largest   lymph node is located in the right jugulodigastric region (level 2A) and   measures 2.1 cm. A level 5A lymph node/supraclavicular lymph node on the right   measures 1.5 cm. Several smaller lymph nodes measuring 1 cm  or less in the   supraclavicular region are noted.  On the left largest lymph node measures 2 cm   and located in the left jugulodigastric region (level 2A). Several smaller lymph   nodes measuring 1 cm or less are also noted. No necrotic changes within the   lymph nodes are noted. No extracapsular extension is noted. Differential   diagnoses includes reactive lymph nodes although lymphoproliferative disease or   other inflammatory or neoplastic etiologies cannot be entirely excluded and   follow-up to resolution is suggested. No pathologic sclerotic or lytic bone   lesion was identified. Both orbits have a normal CT appearance. Paranasal sinuses are clear. Mastoid air cells are clear. Visualized intracranial structures have a normal CT appearance. 11/16/21 1219  CT HEAD WO CONT Final result    Impression:  Chronic lacunar infarct right thalamus. No acute intracranial abnormality. Narrative:  CT SCAN OF THE BRAIN WITHOUT CONTRAST:               CLINICAL HISTORY: Headaches. Thin axial and coronal and sagittal reconstructions were obtained. All CT scans at this facility are performed using dose reduction optimization   techniques as appropriate to a performed exam including the following: Automated   exposure control, adjustments of the mA and/or kV according to patient size, or   use of iterative reconstruction technique. Ventricles are midline and of normal size. A tiny chronic lacunar infarct in the   right thalamus is noted. No intra or extra-axial hemorrhage, mass or acute   infarction in a major arterial distribution is demonstrated. Prominent pineal   calcifications are noted. Pituitary gland is of normal size               Scans through the posterior fossa show no major infarction, mass or hemorrhage.            The cerebellar tonsils are 3 mm below the foramen magnum that is within the   upper limits of normal. No obstruction of the CSF spaces posteriorly to the   cerebellar tonsils is noted.           Visualized paranasal sinuses are clear. Mastoid air cells are clear. Both orbits have a normal CT appearance. CXR Results  (Last 48 hours)      None               If you feel that you have not received excellent quality care or timely care, please ask to speak to the nurse manager. Please choose us in the future for your continued health care needs. ------------------------------------------------------------------------------------------------------------  The exam and treatment you received in the Emergency Department were for an urgent problem and are not intended as complete care. It is important that you follow-up with a doctor, nurse practitioner, or physician assistant to:  (1) confirm your diagnosis,  (2) re-evaluation of changes in your illness and treatment, and  (3) for ongoing care. If your symptoms become worse or you do not improve as expected and you are unable to reach your usual health care provider, you should return to the Emergency Department. We are available 24 hours a day. Please take your discharge instructions with you when you go to your follow-up appointment. If you have any problem arranging a follow-up appointment, contact the Emergency Department immediately. If a prescription has been provided, please have it filled as soon as possible to prevent a delay in treatment. Read the entire medication instruction sheet provided to you by the pharmacy. If you have any questions or reservations about taking the medication due to side effects or interactions with other medications, please call your primary care physician or contact the ER to speak with the charge nurse. Make an appointment with your family doctor or the physician you were referred to for follow-up of this visit as instructed on your discharge paperwork, as this is a mandatory follow-up.  Return to the ER if you are unable to be seen or if you are unable to be seen in a timely manner. If you have any problem arranging the follow-up visit, contact the Emergency Department immediately.

## 2021-11-22 LAB
BACTERIA SPEC CULT: NORMAL
SPECIAL REQUESTS,SREQ: NORMAL

## 2021-11-25 ENCOUNTER — HOSPITAL ENCOUNTER (EMERGENCY)
Age: 56
Discharge: HOME | End: 2021-11-25
Attending: EMERGENCY MEDICINE | Admitting: EMERGENCY MEDICINE
Payer: COMMERCIAL

## 2021-11-25 VITALS
OXYGEN SATURATION: 96 % | HEIGHT: 70 IN | DIASTOLIC BLOOD PRESSURE: 81 MMHG | WEIGHT: 193 LBS | BODY MASS INDEX: 27.63 KG/M2 | TEMPERATURE: 98.5 F | RESPIRATION RATE: 17 BRPM | SYSTOLIC BLOOD PRESSURE: 134 MMHG | HEART RATE: 76 BPM

## 2021-11-25 DIAGNOSIS — R50.9 FEVER, UNSPECIFIED FEVER CAUSE: Primary | ICD-10-CM

## 2021-11-25 DIAGNOSIS — R59.1 LYMPHADENOPATHY: ICD-10-CM

## 2021-11-25 LAB
ALBUMIN SERPL-MCNC: 3.5 G/DL (ref 3.5–5)
ALBUMIN/GLOB SERPL: 0.9 {RATIO} (ref 1.1–2.2)
ALP SERPL-CCNC: 99 U/L (ref 45–117)
ALT SERPL-CCNC: 18 U/L (ref 12–78)
ANION GAP SERPL CALC-SCNC: 5 MMOL/L (ref 5–15)
APPEARANCE UR: CLEAR
AST SERPL W P-5'-P-CCNC: 14 U/L (ref 15–37)
BACTERIA URNS QL MICRO: NEGATIVE /HPF
BASOPHILS # BLD: 0 K/UL (ref 0–0.1)
BASOPHILS NFR BLD: 0 % (ref 0–1)
BILIRUB SERPL-MCNC: 0.5 MG/DL (ref 0.2–1)
BILIRUB UR QL: NEGATIVE
BUN SERPL-MCNC: 9 MG/DL (ref 6–20)
BUN/CREAT SERPL: 8 (ref 12–20)
CA-I BLD-MCNC: 8.9 MG/DL (ref 8.5–10.1)
CHLORIDE SERPL-SCNC: 104 MMOL/L (ref 97–108)
CO2 SERPL-SCNC: 26 MMOL/L (ref 21–32)
COLOR UR: NORMAL
CREAT SERPL-MCNC: 1.13 MG/DL (ref 0.7–1.3)
DEPRECATED S PYO AG THROAT QL EIA: NEGATIVE
DIFFERENTIAL METHOD BLD: ABNORMAL
EOSINOPHIL # BLD: 0.2 K/UL (ref 0–0.4)
EOSINOPHIL NFR BLD: 2 % (ref 0–7)
ERYTHROCYTE [DISTWIDTH] IN BLOOD BY AUTOMATED COUNT: 12.2 % (ref 11.5–14.5)
GLOBULIN SER CALC-MCNC: 3.9 G/DL (ref 2–4)
GLUCOSE SERPL-MCNC: 116 MG/DL (ref 65–100)
GLUCOSE UR STRIP.AUTO-MCNC: NEGATIVE MG/DL
HCT VFR BLD AUTO: 41.8 % (ref 36.6–50.3)
HETEROPH AB SER QL: NEGATIVE
HGB BLD-MCNC: 13.8 G/DL (ref 12.1–17)
HGB UR QL STRIP: NEGATIVE
IMM GRANULOCYTES # BLD AUTO: 0 K/UL (ref 0–0.04)
IMM GRANULOCYTES NFR BLD AUTO: 0 % (ref 0–0.5)
KETONES UR QL STRIP.AUTO: NEGATIVE MG/DL
LACTATE SERPL-SCNC: 1.2 MMOL/L (ref 0.4–2)
LEUKOCYTE ESTERASE UR QL STRIP.AUTO: NEGATIVE
LYMPHOCYTES # BLD: 1.3 K/UL (ref 0.8–3.5)
LYMPHOCYTES NFR BLD: 14 % (ref 12–49)
MCH RBC QN AUTO: 30.9 PG (ref 26–34)
MCHC RBC AUTO-ENTMCNC: 33 G/DL (ref 30–36.5)
MCV RBC AUTO: 93.5 FL (ref 80–99)
MONOCYTES # BLD: 0.8 K/UL (ref 0–1)
MONOCYTES NFR BLD: 8 % (ref 5–13)
MONOSPOT NEG CTRL,MONNC: NEGATIVE
MONOSPOT POS CTRL,MONPC: POSITIVE
MUCOUS THREADS URNS QL MICRO: NORMAL /LPF
NEUTS SEG # BLD: 7 K/UL (ref 1.8–8)
NEUTS SEG NFR BLD: 76 % (ref 32–75)
NITRITE UR QL STRIP.AUTO: NEGATIVE
NRBC # BLD: 0 K/UL (ref 0–0.01)
NRBC BLD-RTO: 0 PER 100 WBC
PH UR STRIP: 8 [PH] (ref 5–8)
PLATELET # BLD AUTO: 306 K/UL (ref 150–400)
PMV BLD AUTO: 9.4 FL (ref 8.9–12.9)
POTASSIUM SERPL-SCNC: 4.1 MMOL/L (ref 3.5–5.1)
PROT SERPL-MCNC: 7.4 G/DL (ref 6.4–8.2)
PROT UR STRIP-MCNC: NEGATIVE MG/DL
RBC # BLD AUTO: 4.47 M/UL (ref 4.1–5.7)
RBC #/AREA URNS HPF: NORMAL /HPF (ref 0–5)
SODIUM SERPL-SCNC: 135 MMOL/L (ref 136–145)
SP GR UR REFRACTOMETRY: 1.01 (ref 1–1.03)
UROBILINOGEN UR QL STRIP.AUTO: 0.1 EU/DL (ref 0.1–1)
WBC # BLD AUTO: 9.4 K/UL (ref 4.1–11.1)
WBC URNS QL MICRO: NORMAL /HPF (ref 0–4)

## 2021-11-25 PROCEDURE — 87880 STREP A ASSAY W/OPTIC: CPT

## 2021-11-25 PROCEDURE — 96375 TX/PRO/DX INJ NEW DRUG ADDON: CPT

## 2021-11-25 PROCEDURE — 86308 HETEROPHILE ANTIBODY SCREEN: CPT

## 2021-11-25 PROCEDURE — 80053 COMPREHEN METABOLIC PANEL: CPT

## 2021-11-25 PROCEDURE — 74011250636 HC RX REV CODE- 250/636: Performed by: EMERGENCY MEDICINE

## 2021-11-25 PROCEDURE — 96374 THER/PROPH/DIAG INJ IV PUSH: CPT

## 2021-11-25 PROCEDURE — 87040 BLOOD CULTURE FOR BACTERIA: CPT

## 2021-11-25 PROCEDURE — 96376 TX/PRO/DX INJ SAME DRUG ADON: CPT

## 2021-11-25 PROCEDURE — 81003 URINALYSIS AUTO W/O SCOPE: CPT

## 2021-11-25 PROCEDURE — 85025 COMPLETE CBC W/AUTO DIFF WBC: CPT

## 2021-11-25 PROCEDURE — 99284 EMERGENCY DEPT VISIT MOD MDM: CPT

## 2021-11-25 PROCEDURE — 74011000250 HC RX REV CODE- 250: Performed by: EMERGENCY MEDICINE

## 2021-11-25 PROCEDURE — 83605 ASSAY OF LACTIC ACID: CPT

## 2021-11-25 PROCEDURE — 36415 COLL VENOUS BLD VENIPUNCTURE: CPT

## 2021-11-25 PROCEDURE — 93005 ELECTROCARDIOGRAM TRACING: CPT

## 2021-11-25 RX ORDER — NAPROXEN 500 MG/1
500 TABLET ORAL
Qty: 20 TABLET | Refills: 0 | Status: SHIPPED | OUTPATIENT
Start: 2021-11-25 | End: 2022-10-28

## 2021-11-25 RX ORDER — CEPHALEXIN 500 MG/1
500 CAPSULE ORAL 4 TIMES DAILY
Qty: 28 CAPSULE | Refills: 0 | Status: SHIPPED | OUTPATIENT
Start: 2021-11-25 | End: 2021-12-02

## 2021-11-25 RX ORDER — MORPHINE SULFATE 4 MG/ML
4 INJECTION INTRAVENOUS
Status: COMPLETED | OUTPATIENT
Start: 2021-11-25 | End: 2021-11-25

## 2021-11-25 RX ORDER — METHYLPREDNISOLONE 4 MG/1
TABLET ORAL
Qty: 1 DOSE PACK | Refills: 0 | Status: SHIPPED | OUTPATIENT
Start: 2021-11-25 | End: 2022-10-28 | Stop reason: ALTCHOICE

## 2021-11-25 RX ORDER — KETOROLAC TROMETHAMINE 30 MG/ML
15 INJECTION, SOLUTION INTRAMUSCULAR; INTRAVENOUS
Status: COMPLETED | OUTPATIENT
Start: 2021-11-25 | End: 2021-11-25

## 2021-11-25 RX ADMIN — VANCOMYCIN HYDROCHLORIDE 1750 MG: 1 INJECTION, POWDER, LYOPHILIZED, FOR SOLUTION INTRAVENOUS at 15:14

## 2021-11-25 RX ADMIN — CEFTRIAXONE SODIUM 2 G: 2 INJECTION, POWDER, FOR SOLUTION INTRAMUSCULAR; INTRAVENOUS at 13:46

## 2021-11-25 RX ADMIN — SODIUM CHLORIDE 500 ML: 9 INJECTION, SOLUTION INTRAVENOUS at 13:08

## 2021-11-25 RX ADMIN — MORPHINE SULFATE 4 MG: 4 INJECTION INTRAVENOUS at 13:46

## 2021-11-25 RX ADMIN — KETOROLAC TROMETHAMINE 15 MG: 30 INJECTION, SOLUTION INTRAMUSCULAR at 13:47

## 2021-11-25 RX ADMIN — METHYLPREDNISOLONE SODIUM SUCCINATE 125 MG: 125 INJECTION, POWDER, FOR SOLUTION INTRAMUSCULAR; INTRAVENOUS at 13:46

## 2021-11-25 RX ADMIN — METHYLPREDNISOLONE SODIUM SUCCINATE 125 MG: 125 INJECTION, POWDER, FOR SOLUTION INTRAMUSCULAR; INTRAVENOUS at 15:14

## 2021-11-25 RX ADMIN — SODIUM CHLORIDE 1000 ML: 9 INJECTION, SOLUTION INTRAVENOUS at 14:30

## 2021-11-25 NOTE — ED TRIAGE NOTES
Fever off and on for approx 3 wks, reports now having swelling and pain to rt side of neck. Seen at ER in [place] with no dx.

## 2021-11-25 NOTE — ED PROVIDER NOTES
EMERGENCY DEPARTMENT HISTORY AND PHYSICAL EXAM      Date: 11/25/2021  Patient Name: Mary Infante      History of Presenting Illness     Chief Complaint   Patient presents with    Fever    Neck Pain       History Provided By: Patient and Patient's Wife    HPI: Mary Infante, 60 y.o. male with a past medical history significant No significant past medical history presents to the ED with cc of having transient fevers and chills as well as lymphadenopathy in the neck for the past 3 weeks. He was seen and evaluated at the stand-alone emergency care center and received basic labs without a true source of infection determined. Patient denies any chest pain, shortness of breath, rash, diarrhea, night sweats, weight loss. There are no other complaints, changes, or physical findings at this time. PCP: Brit Laboy NP    Current Outpatient Medications   Medication Sig Dispense Refill    methylPREDNISolone (Medrol, Berny,) 4 mg tablet Per dose pack instructions 1 Dose Pack 0    cephALEXin (Keflex) 500 mg capsule Take 1 Capsule by mouth four (4) times daily for 7 days. 28 Capsule 0    naproxen (NAPROSYN) 500 mg tablet Take 1 Tablet by mouth every twelve (12) hours as needed for Pain. 20 Tablet 0    tiZANidine (ZANAFLEX) 2 mg capsule Take 2 mg by mouth three (3) times daily.  gabapentin (NEURONTIN) 100 mg capsule Take  by mouth three (3) times daily.  levothyroxine (SYNTHROID) 50 mcg tablet TAKE 1 TABLET BY MOUTH EVERY DAY BEFORE BREAKFAST 90 Tablet 1    traZODone (DESYREL) 50 mg tablet TAKE 1 TABLET BY MOUTH EVERY NIGHT 30 Tablet 3    diclofenac EC (VOLTAREN) 75 mg EC tablet Take 1 Tablet by mouth two (2) times a day.  carbinoxamine maleate 4 mg tab Take 1 Tab by mouth two (2) times a day. Indications: allergic conjunctivitis 60 Tab 5    albuterol (ProAir HFA) 90 mcg/actuation inhaler Take 1 Puff by inhalation every four (4) hours as needed for Wheezing or Shortness of Breath.  1 Inhaler 3    albuterol (PROVENTIL VENTOLIN) 2.5 mg /3 mL (0.083 %) nebu Take 3 mL by inhalation every four (4) hours as needed for Wheezing. 61 Nebule 3       Past History     Past Medical History:  Past Medical History:   Diagnosis Date    Chronic obstructive pulmonary disease ([address]     Shoulder disorder        Past Surgical History:  Past Surgical History:   Procedure Laterality Date    HX HERNIA REPAIR      x 2        Family History:  Family History   Problem Relation Age of Onset    Alcohol abuse Father        Social History:  Social History     Tobacco Use    Smoking status: Former Smoker    Smokeless tobacco: Former User   Substance Use Topics    Alcohol use: Yes     Comment: rare     Drug use: No       Allergies: Allergies   Allergen Reactions    Hydrocodone Nausea and Vomiting    Voltaren [Diclofenac Sodium] Other (comments)     Stomach cramps         Review of Systems     Review of Systems   Constitutional: Positive for fatigue and fever. Negative for appetite change and chills. HENT: Positive for sinus pressure and trouble swallowing. Negative for congestion and sinus pain. Eyes: Negative. Negative for pain and visual disturbance. Respiratory: Negative. Negative for chest tightness and shortness of breath. Cardiovascular: Negative. Negative for chest pain. Gastrointestinal: Negative. Negative for abdominal pain, diarrhea, nausea and vomiting. Genitourinary: Negative. Negative for difficulty urinating. No discharge   Musculoskeletal: Negative. Negative for arthralgias. Skin: Negative. Negative for rash. Neurological: Positive for headaches. Negative for weakness. Hematological: Negative. Psychiatric/Behavioral: Negative. Negative for agitation. The patient is not nervous/anxious. All other systems reviewed and are negative. Physical Exam     Physical Exam  Vitals and nursing note reviewed.    Constitutional:       General: He is not in acute distress. Appearance: He is well-developed. HENT:      Head: Normocephalic and atraumatic. Nose: Nose normal.      Mouth/Throat:      Mouth: Mucous membranes are moist.      Pharynx: Oropharynx is clear. No oropharyngeal exudate. Eyes:      General:         Right eye: No discharge. Left eye: No discharge. Conjunctiva/sclera: Conjunctivae normal.      Pupils: Pupils are equal, round, and reactive to light. Cardiovascular:      Rate and Rhythm: Normal rate and regular rhythm. Chest Wall: PMI is not displaced. No thrill. Heart sounds: Normal heart sounds. No murmur heard. No friction rub. No gallop. Pulmonary:      Effort: Pulmonary effort is normal. No respiratory distress. Breath sounds: Normal breath sounds. No wheezing or rales. Chest:      Chest wall: No tenderness. Abdominal:      General: Bowel sounds are normal. There is no distension. Palpations: Abdomen is soft. There is no mass. Tenderness: There is no abdominal tenderness. There is no guarding or rebound. Musculoskeletal:         General: Normal range of motion. Cervical back: Normal range of motion and neck supple. Lymphadenopathy:      Cervical: Cervical adenopathy present. Skin:     General: Skin is warm and dry. Capillary Refill: Capillary refill takes less than 2 seconds. Findings: No erythema or rash. Neurological:      Mental Status: He is alert and oriented to person, place, and time. Cranial Nerves: No cranial nerve deficit.       Coordination: Coordination normal.   Psychiatric:         Mood and Affect: Mood normal.         Behavior: Behavior normal.         Lab and Diagnostic Study Results     Labs -     Recent Results (from the past 12 hour(s))   CBC WITH AUTOMATED DIFF    Collection Time: 11/25/21  1:11 PM   Result Value Ref Range    WBC 9.4 4.1 - 11.1 K/uL    RBC 4.47 4.10 - 5.70 M/uL    HGB 13.8 12.1 - 17.0 g/dL    HCT 41.8 36.6 - 50.3 %    MCV 93.5 80.0 - 99.0 FL    MCH 30.9 26.0 - 34.0 PG    MCHC 33.0 30.0 - 36.5 g/dL    RDW 12.2 11.5 - 14.5 %    PLATELET 601 821 - 223 K/uL    MPV 9.4 8.9 - 12.9 FL    NRBC 0.0 0.0  WBC    ABSOLUTE NRBC 0.00 0.00 - 0.01 K/uL    NEUTROPHILS 76 (H) 32 - 75 %    LYMPHOCYTES 14 12 - 49 %    MONOCYTES 8 5 - 13 %    EOSINOPHILS 2 0 - 7 %    BASOPHILS 0 0 - 1 %    IMMATURE GRANULOCYTES 0 0 - 0.5 %    ABS. NEUTROPHILS 7.0 1.8 - 8.0 K/UL    ABS. LYMPHOCYTES 1.3 0.8 - 3.5 K/UL    ABS. MONOCYTES 0.8 0.0 - 1.0 K/UL    ABS. EOSINOPHILS 0.2 0.0 - 0.4 K/UL    ABS. BASOPHILS 0.0 0.0 - 0.1 K/UL    ABS. IMM. GRANS. 0.0 0.00 - 0.04 K/UL    DF AUTOMATED     METABOLIC PANEL, COMPREHENSIVE    Collection Time: 11/25/21  1:11 PM   Result Value Ref Range    Sodium 135 (L) 136 - 145 mmol/L    Potassium 4.1 3.5 - 5.1 mmol/L    Chloride 104 97 - 108 mmol/L    CO2 26 21 - 32 mmol/L    Anion gap 5 5 - 15 mmol/L    Glucose 116 (H) 65 - 100 mg/dL    BUN 9 6 - 20 mg/dL    Creatinine 1.13 0.70 - 1.30 mg/dL    BUN/Creatinine ratio 8 (L) 12 - 20      GFR est AA >60 >60 ml/min/1.73m2    GFR est non-AA >60 >60 ml/min/1.73m2    Calcium 8.9 8.5 - 10.1 mg/dL    Bilirubin, total 0.5 0.2 - 1.0 mg/dL    AST (SGOT) 14 (L) 15 - 37 U/L    ALT (SGPT) 18 12 - 78 U/L    Alk.  phosphatase 99 45 - 117 U/L    Protein, total 7.4 6.4 - 8.2 g/dL    Albumin 3.5 3.5 - 5.0 g/dL    Globulin 3.9 2.0 - 4.0 g/dL    A-G Ratio 0.9 (L) 1.1 - 2.2     LACTIC ACID    Collection Time: 11/25/21  1:11 PM   Result Value Ref Range    Lactic acid 1.2 0.4 - 2.0 mmol/L   URINALYSIS W/ RFLX MICROSCOPIC    Collection Time: 11/25/21  2:57 PM   Result Value Ref Range    Color Yellow/Straw      Appearance Clear Clear      Specific gravity 1.010 1.003 - 1.030      pH (UA) 8.0 5.0 - 8.0      Protein Negative Negative mg/dL    Glucose Negative Negative mg/dL    Ketone Negative Negative mg/dL    Bilirubin Negative Negative      Blood Negative Negative      Urobilinogen 0.1 0.1 - 1.0 EU/dL    Nitrites Negative Negative      Leukocyte Esterase Negative Negative      WBC 0-4 0 - 4 /hpf    RBC 0-5 0 - 5 /hpf    Bacteria Negative Negative /hpf    Mucus Trace /lpf   STREP AG SCREEN, GROUP A    Collection Time: 11/25/21  3:15 PM    Specimen: Throat   Result Value Ref Range    Group A Strep Ag ID Negative         Radiologic Studies -   [unfilled]  CT Results  (Last 48 hours)    None        CXR Results  (Last 48 hours)    None          Medical Decision Making and ED Course   - I am the first and primary provider for this patient AND AM THE PRIMARY PROVIDER OF RECORD. - I reviewed the vital signs, available nursing notes, past medical history, past surgical history, family history and social history. - Initial assessment performed. The patients presenting problems have been discussed, and the staff are in agreement with the care plan formulated and outlined with them. I have encouraged them to ask questions as they arise throughout their visit. Vital Signs-Reviewed the patient's vital signs. Patient Vitals for the past 12 hrs:   Temp Pulse Resp BP SpO2   11/25/21 1500 98.5 °F (36.9 °C) 76 17 134/81 96 %   11/25/21 1330 -- 72 18 133/89 94 %   11/25/21 1251 (!) 101.8 °F (38.8 °C) 91 20 (!) 144/91 96 %     Records Reviewed: Nursing Notes and Old Medical Records    The patient presents with transient fevers and right-sided neck pain for the past 3 weeks with a differential diagnosis of less likely meningitis but more likely dental origin. Will assess with sepsis work-up and reevaluate patient's old labs and imaging. Will start treatment with antibiotics. ED Course:              Provider Notes (Medical Decision Making):   Patient is a 68-year-old male with 2 to 3 weeks of transient fevers without appreciable source at this point time. We will be treating the patient with Keflex as an outpatient as well as steroids. He does have a significant lymphadenopathy. We will have him follow-up with ENT.   I also provided the patient my cell phone number in case they have further questions. [facility]           Consultations:       Consultations: - NONE        Procedures and Critical Care           Disposition     Disposition: Condition stable    Discharged    Remove if not discharged  DISCHARGE PLAN:  1. Current Discharge Medication List      CONTINUE these medications which have NOT CHANGED    Details   tiZANidine (ZANAFLEX) 2 mg capsule Take 2 mg by mouth three (3) times daily. gabapentin (NEURONTIN) 100 mg capsule Take  by mouth three (3) times daily. levothyroxine (SYNTHROID) 50 mcg tablet TAKE 1 TABLET BY MOUTH EVERY DAY BEFORE BREAKFAST  Qty: 90 Tablet, Refills: 1    Associated Diagnoses: Hypothyroidism, unspecified type      traZODone (DESYREL) 50 mg tablet TAKE 1 TABLET BY MOUTH EVERY NIGHT  Qty: 30 Tablet, Refills: 3    Associated Diagnoses: Insomnia, unspecified type      diclofenac EC (VOLTAREN) 75 mg EC tablet Take 1 Tablet by mouth two (2) times a day. carbinoxamine maleate 4 mg tab Take 1 Tab by mouth two (2) times a day. Indications: allergic conjunctivitis  Qty: 60 Tab, Refills: 5      albuterol (ProAir HFA) 90 mcg/actuation inhaler Take 1 Puff by inhalation every four (4) hours as needed for Wheezing or Shortness of Breath. Qty: 1 Inhaler, Refills: 3    Associated Diagnoses: Chronic obstructive pulmonary disease, unspecified COPD type ([address] Bronchitis      albuterol (PROVENTIL VENTOLIN) 2.5 mg /3 mL (0.083 %) nebu Take 3 mL by inhalation every four (4) hours as needed for Wheezing. Qty: 60 Nebule, Refills: 3    Associated Diagnoses: Chronic obstructive pulmonary disease, unspecified COPD type ([address] Bronchitis           2. Follow-up Information     Follow up With Specialties Details Why Gavin Alba MD Otolaryngology Call in 2 days  [address]  [address] [facility]  [phone]          3. Return to ED if worse   4.    Current Discharge Medication List START taking these medications    Details   methylPREDNISolone (Medrol, Berny,) 4 mg tablet Per dose pack instructions  Qty: 1 Dose Pack, Refills: 0  Start date: 11/25/2021      cephALEXin (Keflex) 500 mg capsule Take 1 Capsule by mouth four (4) times daily for 7 days. Qty: 28 Capsule, Refills: 0  Start date: 11/25/2021, End date: 12/2/2021      naproxen (NAPROSYN) 500 mg tablet Take 1 Tablet by mouth every twelve (12) hours as needed for Pain. Qty: 20 Tablet, Refills: 0  Start date: 11/25/2021             Diagnosis     Clinical Impression:   1. Fever, unspecified fever cause    2. Lymphadenopathy        Attestations:    Lennie Norton MD    Please note that this dictation was completed with [employer], the [employer] voice recognition software. Quite often unanticipated grammatical, syntax, homophones, and other interpretive errors are inadvertently transcribed by the computer software. Please disregard these errors. Please excuse any errors that have escaped final proofreading. Thank you.

## 2021-11-26 ENCOUNTER — VIRTUAL VISIT (OUTPATIENT)
Dept: ENT CLINIC | Age: 56
End: 2021-11-26
Payer: COMMERCIAL

## 2021-11-26 DIAGNOSIS — I88.9 CERVICAL LYMPHADENITIS: Primary | ICD-10-CM

## 2021-11-26 DIAGNOSIS — B34.9 ACUTE VIRAL SYNDROME: ICD-10-CM

## 2021-11-26 LAB
ATRIAL RATE: 87 BPM
CALCULATED P AXIS, ECG09: 29 DEGREES
CALCULATED R AXIS, ECG10: -46 DEGREES
CALCULATED T AXIS, ECG11: 53 DEGREES
DIAGNOSIS, 93000: NORMAL
P-R INTERVAL, ECG05: 130 MS
Q-T INTERVAL, ECG07: 340 MS
QRS DURATION, ECG06: 88 MS
QTC CALCULATION (BEZET), ECG08: 409 MS
VENTRICULAR RATE, ECG03: 87 BPM

## 2021-11-26 PROCEDURE — 99203 OFFICE O/P NEW LOW 30 MIN: CPT | Performed by: OTOLARYNGOLOGY

## 2021-11-26 NOTE — PROGRESS NOTES
Subjective:    Saji Pelayo   60 y.o.   1965     New Patient Visit    Virtual visit via [email or web address] me    Location - neck, throat    Quality - swollen LNs, sore throat    Severity -  moderate    Duration - 3-4 weeks    Timing - ongoing    Context - pt states started with high fevers, sweats, progressed to some mild right sided sore throat and mild difficulty swallowing and then prominent, tender LNs more right sided; went to ED 10 days ago, had CT but overall negative workup including labs; then went again yesterday due to persistent fevers; same overall findings with normal WBC, negative Strep and Mono; was sent home on keflex, prednisone and naproxen; denies much throat pain now;  glands    Modifying Features - none    Associated symptoms/signs - high fever      Review of Systems  Review of Systems   Constitutional: Positive for fever. Negative for chills. HENT: Positive for sore throat. Negative for ear pain, hearing loss, nosebleeds and tinnitus. Eyes: Negative for blurred vision and double vision. Respiratory: Negative for cough, sputum production and shortness of breath. Cardiovascular: Negative for chest pain and palpitations. Gastrointestinal: Negative for heartburn, nausea and vomiting. Musculoskeletal: Positive for neck pain. Negative for joint pain. Skin: Negative. Neurological: Negative for dizziness, speech change, weakness and headaches. Endo/Heme/Allergies: Negative for environmental allergies. Does not bruise/bleed easily. Psychiatric/Behavioral: Negative for memory loss. The patient does not have insomnia.           Past Medical History:   Diagnosis Date    Chronic obstructive pulmonary disease ([address]     Shoulder disorder      Past Surgical History:   Procedure Laterality Date    HX HERNIA REPAIR      x 2       Family History   Problem Relation Age of Onset    Alcohol abuse Father      Social History     Tobacco Use    Smoking status: Former Smoker    Smokeless tobacco: Former User   Substance Use Topics    Alcohol use: Yes     Comment: rare       Prior to Admission medications    Medication Sig Start Date End Date Taking? Authorizing Provider   methylPREDNISolone (Medrol, Berny,) 4 mg tablet Per dose pack instructions 11/25/21   Haritha Crisostomo MD   cephALEXin (Keflex) 500 mg capsule Take 1 Capsule by mouth four (4) times daily for 7 days. 11/25/21 12/2/21  Haritah Crisostomo MD   naproxen (NAPROSYN) 500 mg tablet Take 1 Tablet by mouth every twelve (12) hours as needed for Pain. 11/25/21   Haritha Crisostomo MD   tiZANidine (ZANAFLEX) 2 mg capsule Take 2 mg by mouth three (3) times daily. OtherTaz MD   gabapentin (NEURONTIN) 100 mg capsule Take  by mouth three (3) times daily. OtherTaz MD   levothyroxine (SYNTHROID) 50 mcg tablet TAKE 1 TABLET BY MOUTH EVERY DAY BEFORE BREAKFAST 10/18/21   Delia Fuchs MD   traZODone (DESYREL) 50 mg tablet TAKE 1 TABLET BY MOUTH EVERY NIGHT 8/23/21   Delia Fuchs MD   diclofenac EC (VOLTAREN) 75 mg EC tablet Take 1 Tablet by mouth two (2) times a day. 7/15/21   ProviderMelissa   carbinoxamine maleate 4 mg tab Take 1 Tab by mouth two (2) times a day. Indications: allergic conjunctivitis 5/12/21   Delia Fuchs MD   albuterol (ProAir HFA) 90 mcg/actuation inhaler Take 1 Puff by inhalation every four (4) hours as needed for Wheezing or Shortness of Breath. 12/31/20   Delia Fuchs MD   albuterol (PROVENTIL VENTOLIN) 2.5 mg /3 mL (0.083 %) nebu Take 3 mL by inhalation every four (4) hours as needed for Wheezing. 4/6/20   Elex Saliva, NP        Allergies   Allergen Reactions    Hydrocodone Nausea and Vomiting    Voltaren [Diclofenac Sodium] Other (comments)     Stomach cramps         Objective: There were no vitals taken for this visit. Physical Exam  Vitals and nursing note reviewed. Constitutional:       General: He is not in acute distress. HENT:      Head: Normocephalic. Right Ear: Hearing and external ear normal.      Left Ear: Hearing and external ear normal.      Nose: No nasal deformity, signs of injury or nasal tenderness. Right Sinus: No maxillary sinus tenderness or frontal sinus tenderness. Left Sinus: No maxillary sinus tenderness or frontal sinus tenderness. Mouth/Throat:      Mouth: Mucous membranes are moist.      Tongue: No lesions. Tongue does not deviate from midline. Pharynx: Oropharynx is clear. Eyes:      Extraocular Movements: Extraocular movements intact. Conjunctiva/sclera: Conjunctivae normal.   Pulmonary:      Effort: Pulmonary effort is normal.      Breath sounds: No stridor. Musculoskeletal:      Cervical back: Normal range of motion. No muscular tenderness. Lymphadenopathy:      Cervical: Cervical adenopathy present. Skin:     Coloration: Skin is not jaundiced. Findings: No bruising or lesion. Neurological:      General: No focal deficit present. Mental Status: He is alert and oriented to person, place, and time. Psychiatric:         Mood and Affect: Mood normal.         Behavior: Behavior normal. Behavior is cooperative. Thought Content: Thought content normal.         Assessment/Plan:     Encounter Diagnoses   Name Primary?  Cervical lymphadenitis Yes    Acute viral syndrome      I have reviewed Ct imaging personally. I have also reviewed ED notes and labwork done. 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 Agree with antibiotics, steroids, NSAIDs. No obvious source seen on his CT scan. If symptoms persist after treatment, I would consider consultation with ID for further testing, also consider neck US to assess for Lemierre's syndrome ( doubtful with normal WBC). Also if LAD persists after proper ID workup then biopsy can be considered. Too early for that at this point. Fu in office 1 week. Seek medical attention if sx worsen.   Pt agrees with plan.      Follow-up and Dispositions    · Return in about 1 week (around 12/3/2021). Thank you for referring this patient,    Stefan Sanders MD, [address] [facility]    [address] [place] [address] [address]  [facility]    [address] [address]  [place], [id number]

## 2021-11-29 ENCOUNTER — OFFICE VISIT (OUTPATIENT)
Dept: FAMILY MEDICINE CLINIC | Age: 56
End: 2021-11-29
Payer: COMMERCIAL

## 2021-11-29 VITALS
SYSTOLIC BLOOD PRESSURE: 157 MMHG | OXYGEN SATURATION: 97 % | DIASTOLIC BLOOD PRESSURE: 95 MMHG | RESPIRATION RATE: 20 BRPM | WEIGHT: 197 LBS | HEART RATE: 52 BPM | BODY MASS INDEX: 28.2 KG/M2 | TEMPERATURE: 97.7 F | HEIGHT: 70 IN

## 2021-11-29 DIAGNOSIS — I63.81 LACUNAR INFARCTION (HCC): Primary | ICD-10-CM

## 2021-11-29 DIAGNOSIS — G47.00 INSOMNIA, UNSPECIFIED TYPE: ICD-10-CM

## 2021-11-29 DIAGNOSIS — I88.9 CERVICAL LYMPHADENITIS: ICD-10-CM

## 2021-11-29 DIAGNOSIS — E03.9 HYPOTHYROIDISM, UNSPECIFIED TYPE: ICD-10-CM

## 2021-11-29 DIAGNOSIS — R14.0 ABDOMINAL BLOATING: ICD-10-CM

## 2021-11-29 DIAGNOSIS — J44.9 CHRONIC OBSTRUCTIVE PULMONARY DISEASE, UNSPECIFIED COPD TYPE (HCC): ICD-10-CM

## 2021-11-29 DIAGNOSIS — E78.2 MIXED HYPERLIPIDEMIA: ICD-10-CM

## 2021-11-29 PROCEDURE — 99214 OFFICE O/P EST MOD 30 MIN: CPT | Performed by: FAMILY MEDICINE

## 2021-11-29 RX ORDER — TRAZODONE HYDROCHLORIDE 50 MG/1
50 TABLET ORAL
Qty: 30 TABLET | Refills: 5 | Status: SHIPPED | OUTPATIENT
Start: 2021-11-29

## 2021-11-29 NOTE — PROGRESS NOTES
Patient here for 1 week follow up. ER visit for fever of 104.8. on steroids and antibiotics. 1. Have you been to the ER, urgent care clinic since your last visit? Hospitalized since your last visit? No    2. Have you seen or consulted any other health care providers outside of the [address] [place] [address] since your last visit? Include any pap smears or colon screening. No            Chief Complaint   Patient presents with    Follow-up     fever, swollen glands in neck, ED put on steroids, antibiotics     He is a 60 y.o. male who presents for evalution. Reviewed PmHx, RxHx, FmHx, SocHx, AllgHx and updated and dated in the chart. Patient Active Problem List    Diagnosis    Lacunar infarction ([facility]    Abdominal bloating    Cervical lymphadenitis    Acute viral syndrome    Chronic obstructive pulmonary disease ([facility]    Hypothyroidism    Prediabetes    Mixed hyperlipidemia    Insomnia       Review of Systems - negative except as listed above in the HPI    Objective:     Vitals:    11/29/21 0856   BP: (!) 157/95   Pulse: (!) 52   Resp: 20   Temp: 97.7 °F (36.5 °C)   SpO2: 97%   Weight: 197 lb (89.4 kg)   Height: 5' 10\" (1.778 m)         Assessment/ Plan:   Diagnoses and all orders for this visit:    1. Lacunar infarction ([facility]  -     REFERRAL TO NEUROLOGY  Patient status post going to the hospital was notified that he had a look lacunar infarcts on his CT. CT reviewed in chart. This point like refer patient to neurology for further evaluation and care. 2. Chronic obstructive pulmonary disease, unspecified COPD type ([facility]  Stable  3. Hypothyroidism, unspecified type  -     TSH 3RD GENERATION; Future    4. Mixed hyperlipidemia  See labs  5. Abdominal bloating  -     REFERRAL TO GASTROENTEROLOGY  Patient never went to see gastro, refer back to gastro for continue abdominal issues and bloating  6. Insomnia, unspecified type  -     traZODone (DESYREL) 50 mg tablet;  Take 1 Tablet by mouth nightly. Beneficial with trazodone  7. Cervical lymphadenitis  Patient on current medications for this treatment, see CT. Labs were negative. Refer if not better. I have discussed the diagnosis with the patient and the intended plan as seen in the above orders. The patient understands and agrees with the plan. The patient has received an after-visit summary and questions were answered concerning future plans. Medication Side Effects and Warnings were discussed with patient  Patient Labs were reviewed and or requested:  Patient Past Records were reviewed and or requested    Lyndsey Abrams M.D. There are no Patient Instructions on file for this visit.

## 2021-11-30 LAB — TSH SERPL DL<=0.005 MIU/L-ACNC: 1.74 UIU/ML (ref 0.45–4.5)

## 2021-12-01 LAB
BACTERIA SPEC CULT: NORMAL
SPECIAL REQUESTS,SREQ: NORMAL

## 2021-12-03 ENCOUNTER — OFFICE VISIT (OUTPATIENT)
Dept: ENT CLINIC | Age: 56
End: 2021-12-03
Payer: COMMERCIAL

## 2021-12-03 VITALS
DIASTOLIC BLOOD PRESSURE: 78 MMHG | HEIGHT: 70 IN | BODY MASS INDEX: 28.2 KG/M2 | RESPIRATION RATE: 18 BRPM | OXYGEN SATURATION: 98 % | SYSTOLIC BLOOD PRESSURE: 120 MMHG | HEART RATE: 82 BPM | WEIGHT: 197 LBS | TEMPERATURE: 78 F

## 2021-12-03 DIAGNOSIS — M54.2 NECK PAIN: ICD-10-CM

## 2021-12-03 DIAGNOSIS — J34.2 DNS (DEVIATED NASAL SEPTUM): ICD-10-CM

## 2021-12-03 DIAGNOSIS — I88.9 CERVICAL LYMPHADENITIS: Primary | ICD-10-CM

## 2021-12-03 PROCEDURE — 99214 OFFICE O/P EST MOD 30 MIN: CPT | Performed by: OTOLARYNGOLOGY

## 2021-12-03 NOTE — LETTER
12/3/2021    Patient: Fiona Nails   YOB: 1965   Date of Visit: 12/3/2021     Melanie Lynne NP  [id number] [facility] [phone]  [address] [facility] [place] [address]    Dear Melanie Lynne NP,      Thank you for referring Mr. Moi Gallardo to [facility] [address], THROAT AND ALLERGY CARE for evaluation. My notes for this consultation are attached. If you have questions, please do not hesitate to call me. I look forward to following your patient along with you.       Sincerely,    Jean Mora MD

## 2021-12-03 NOTE — PROGRESS NOTES
Subjective:    Robinson Patel   60 y.o.   1965     Follow-up visit      Location - neck, throat    Quality - swollen LNs, sore throat    Severity -  moderate    Duration - 3-4 weeks    Timing - ongoing    Context - pt states started with high fevers, sweats, progressed to some mild right sided sore throat and mild difficulty swallowing and then prominent, tender LNs more right sided; went to ED 10 days ago, had CT but overall negative workup including labs; then went again yesterday due to persistent fevers; same overall findings with normal WBC, negative Strep and Mono; was sent home on keflex, prednisone and naproxen; denies much throat pain now;  glands    Modifying Features - none    Associated symptoms/signs - high fever    12/3/2021 -1 week follow-up after his virtual visit last week. Patient states he has completed the antibiotics he is no longer having any fever very minimal soreness. Does feel that the prominent gland in the right neck is regressing. Complains of some mild throat congestion and cough. No sore throat, no dysphagia    Review of Systems  Review of Systems   Constitutional: Positive for fever. Negative for chills. HENT: Positive for sore throat. Negative for ear pain, hearing loss, nosebleeds and tinnitus. Eyes: Negative for blurred vision and double vision. Respiratory: Negative for cough, sputum production and shortness of breath. Cardiovascular: Negative for chest pain and palpitations. Gastrointestinal: Negative for heartburn, nausea and vomiting. Musculoskeletal: Positive for neck pain. Negative for joint pain. Skin: Negative. Neurological: Negative for dizziness, speech change, weakness and headaches. Endo/Heme/Allergies: Negative for environmental allergies. Does not bruise/bleed easily. Psychiatric/Behavioral: Negative for memory loss. The patient does not have insomnia.           Past Medical History:   Diagnosis Date    Chronic obstructive pulmonary disease (HCC)     Shoulder disorder      Past Surgical History:   Procedure Laterality Date    HX HERNIA REPAIR      x 2       Family History   Problem Relation Age of Onset    Alcohol abuse Father      Social History     Tobacco Use    Smoking status: Former Smoker    Smokeless tobacco: Former User   Substance Use Topics    Alcohol use: Yes     Comment: rare       Prior to Admission medications    Medication Sig Start Date End Date Taking? Authorizing Provider   traZODone (DESYREL) 50 mg tablet Take 1 Tablet by mouth nightly. 11/29/21   Lillian Conde MD   methylPREDNISolone (Medrol, Berny,) 4 mg tablet Per dose pack instructions 11/25/21   Anthony Sauer MD   cephALEXin (Keflex) 500 mg capsule Take 1 Capsule by mouth four (4) times daily for 7 days. 11/25/21 12/2/21  Anthony Sauer MD   naproxen (NAPROSYN) 500 mg tablet Take 1 Tablet by mouth every twelve (12) hours as needed for Pain. 11/25/21   Anthony Sauer MD   tiZANidine (ZANAFLEX) 2 mg capsule Take 2 mg by mouth three (3) times daily. Taz Lyle MD   gabapentin (NEURONTIN) 100 mg capsule Take  by mouth three (3) times daily. Taz Lyle MD   levothyroxine (SYNTHROID) 50 mcg tablet TAKE 1 TABLET BY MOUTH EVERY DAY BEFORE BREAKFAST 10/18/21   Lillian Conde MD   diclofenac EC (VOLTAREN) 75 mg EC tablet Take 1 Tablet by mouth two (2) times a day. 7/15/21   Melissa Allan   carbinoxamine maleate 4 mg tab Take 1 Tab by mouth two (2) times a day. Indications: allergic conjunctivitis 5/12/21   Lillian Conde MD   albuterol (ProAir HFA) 90 mcg/actuation inhaler Take 1 Puff by inhalation every four (4) hours as needed for Wheezing or Shortness of Breath. 12/31/20   Lillian Conde MD   albuterol (PROVENTIL VENTOLIN) 2.5 mg /3 mL (0.083 %) nebu Take 3 mL by inhalation every four (4) hours as needed for Wheezing.  4/6/20   Nelson Heredia NP        Allergies   Allergen Reactions    Hydrocodone Nausea and Vomiting    Voltaren [Diclofenac Sodium] Other (comments)     Stomach cramps         Objective:     Visit Vitals  /78 (BP 1 Location: Left upper arm, BP Patient Position: Sitting, BP Cuff Size: Adult)   Pulse 82   Temp (!) 78 °F (25.6 °C) (Temporal)   Resp 18   Ht 5' 10\" (1.778 m)   Wt 197 lb (89.4 kg)   SpO2 98%   BMI 28.27 kg/m²        Physical Exam  Vitals reviewed. Constitutional:       General: He is awake. Appearance: Normal appearance. He is normal weight. HENT:      Head: Normocephalic and atraumatic. Jaw: There is normal jaw occlusion. No trismus, tenderness or malocclusion. Salivary Glands: Right salivary gland is not diffusely enlarged or tender. Left salivary gland is not diffusely enlarged or tender. Right Ear: Hearing, tympanic membrane, ear canal and external ear normal.      Left Ear: Hearing, tympanic membrane, ear canal and external ear normal.      Ears:      Maradiaga exam findings: does not lateralize. Right Rinne: AC > BC. Left Rinne: AC > BC. Nose: Septal deviation (Leftward severe) present. No mucosal edema or rhinorrhea. Right Turbinates: Not enlarged, swollen or pale. Left Turbinates: Not enlarged, swollen or pale. Right Sinus: No maxillary sinus tenderness or frontal sinus tenderness. Left Sinus: No maxillary sinus tenderness or frontal sinus tenderness. Mouth/Throat:      Lips: Pink. Mouth: Mucous membranes are moist. No oral lesions. Dentition: Normal dentition. No gum lesions. Tongue: No lesions. Palate: No mass and lesions. Pharynx: Oropharynx is clear. Uvula midline. Tonsils: No tonsillar exudate. 0 on the right. 0 on the left. Comments: Absent tonsils  Eyes:      General: Vision grossly intact. Extraocular Movements: Extraocular movements intact. Right eye: No nystagmus. Left eye: No nystagmus. Pupils: Pupils are equal, round, and reactive to light.    Neck: Thyroid: No thyroid mass, thyromegaly or thyroid tenderness. Trachea: Trachea and phonation normal. No tracheal tenderness. Comments: Palpable right level 3 lymph node just adjacent to and below the hyoid, not firm, not fixed approximately 2 cm  Cardiovascular:      Rate and Rhythm: Normal rate and regular rhythm. Pulmonary:      Effort: Pulmonary effort is normal.      Breath sounds: Normal breath sounds. No stridor. No wheezing. Musculoskeletal:         General: Normal range of motion. Cervical back: Normal range of motion. No edema or erythema. Lymphadenopathy:      Cervical: Cervical adenopathy present. Skin:     General: Skin is warm and dry. Neurological:      General: No focal deficit present. Mental Status: He is alert and oriented to person, place, and time. Mental status is at baseline. Cranial Nerves: Cranial nerves are intact. Coordination: Romberg sign negative. Gait: Gait is intact. Psychiatric:         Mood and Affect: Mood normal.         Behavior: Behavior normal. Behavior is cooperative. Assessment/Plan:     Encounter Diagnoses   Name Primary?  Cervical lymphadenitis Yes    DNS (deviated nasal septum)     Neck pain      I have reviewed Ct imaging personally. I have also reviewed ED notes and labwork done. He had a recent TSH also done which was normal.    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 He has finished with antibiotics, steroids, NSAIDs. No obvious source seen on his CT scan or on clinical exam today. He states symptoms have been around 1 month. He is at the point at which I would not wait much longer to do FNA. I'll see him back in 10 days and if lymph node has not regressed significantly we'll do an FNA. If biopsy is negative he may need a more extensive infectious disease work-up and I can send him for consultation. Patient agrees with plan and will follow up on December 14.     Follow-up and Dispositions    · Return in 11 days (on 12/14/2021) for morning appt 20 IOP possible FNA right neck. Stefan Degroot MD, [address] [facility]    [address]  [facility]    [id number] [address]  [place], [place] [address] [place] [address] [address]

## 2021-12-03 NOTE — PROGRESS NOTES
Visit Vitals  /78 (BP 1 Location: Left upper arm, BP Patient Position: Sitting, BP Cuff Size: Adult)   Pulse 82   Temp (!) 78 °F (25.6 °C) (Temporal)   Resp 18   Ht 5' 10\" (1.778 m)   Wt 197 lb (89.4 kg)   SpO2 98%   BMI 28.27 kg/m²     Chief Complaint   Patient presents with    Follow-up     lymph node F/U

## 2021-12-14 ENCOUNTER — OFFICE VISIT (OUTPATIENT)
Dept: NEUROLOGY | Age: 56
End: 2021-12-14

## 2021-12-14 ENCOUNTER — OFFICE VISIT (OUTPATIENT)
Dept: ENT CLINIC | Age: 56
End: 2021-12-14
Payer: COMMERCIAL

## 2021-12-14 VITALS
BODY MASS INDEX: 27.77 KG/M2 | HEART RATE: 74 BPM | OXYGEN SATURATION: 99 % | DIASTOLIC BLOOD PRESSURE: 74 MMHG | WEIGHT: 194 LBS | HEIGHT: 70 IN | RESPIRATION RATE: 16 BRPM | SYSTOLIC BLOOD PRESSURE: 124 MMHG

## 2021-12-14 VITALS
HEART RATE: 70 BPM | OXYGEN SATURATION: 98 % | HEIGHT: 70 IN | SYSTOLIC BLOOD PRESSURE: 122 MMHG | BODY MASS INDEX: 28.2 KG/M2 | RESPIRATION RATE: 18 BRPM | WEIGHT: 197 LBS | DIASTOLIC BLOOD PRESSURE: 80 MMHG | TEMPERATURE: 98.1 F

## 2021-12-14 DIAGNOSIS — J34.2 DNS (DEVIATED NASAL SEPTUM): ICD-10-CM

## 2021-12-14 DIAGNOSIS — I88.9 CERVICAL LYMPHADENITIS: Primary | ICD-10-CM

## 2021-12-14 DIAGNOSIS — R90.89 ABNORMAL CT OF BRAIN: Primary | ICD-10-CM

## 2021-12-14 PROCEDURE — 99213 OFFICE O/P EST LOW 20 MIN: CPT | Performed by: OTOLARYNGOLOGY

## 2021-12-14 PROCEDURE — 99204 OFFICE O/P NEW MOD 45 MIN: CPT | Performed by: PSYCHIATRY & NEUROLOGY

## 2021-12-14 NOTE — PROGRESS NOTES
Chief Complaint   Patient presents with   [address] [facility] Patient     possible old stroke, 11/25/21, patient states he went to the ER with unexpalined fever for 3 weeks and a knot located on the front of his neck, patient was seen in the ED at [facility], referred by PCP     Visit Vitals  /74 (BP 1 Location: Left upper arm, BP Patient Position: Sitting)   Pulse 74   Resp 16   Ht 5' 10\" (1.778 m)   Wt 88 kg (194 lb)   SpO2 99%   BMI 27.84 kg/m²

## 2021-12-14 NOTE — PROGRESS NOTES
[facility] and  [address] [facility] at [address] [facility] [address] [place], [address] [facility] [address]  [phone] Office  [phone]           Referring: Kapil Jacobsen, [address] ,  [address] [place] [address]    Chief Complaint   Patient presents with   174 [facility] Patient     possible old stroke, 19, patient states he went to the ER with unexpalined fever for 3 weeks and a knot located on the front of his neck, patient was seen in the ED at [facility], referred by PCP     57-year-old gentleman who presents today for evaluation of abnormal CT scan. He was having intermittent fevers and swollen neck. Having difficulty moving his neck. Awakened on Thanksgiving day with temperature of 104+ and went to the emergency room. Symptoms have gotten better. Has been following with ENT and surveillance of his lymphadenopathy has been advised. He is never had any strokelike symptoms. He does have family history of stroke. Father had 2 strokes and  from stroke. Maternal grandfather had an MI. He has never had any personal history of heart disease. Does have history of COPD. Previously smoked but quit in . Active gentleman. Has had some shoulder issues on the left having surgery and still having some difficulty has an upcoming MRI to try to address the problem. No focal weakness. No numbness. No tingling. Record review finds patient was seen in the emergency department at [facility] on  of this year with transient fever and chills and lymphadenopathy x3 weeks. His examination demonstrated cervical adenopathy and a temperature of 101.8. He was to follow with ENT. He was given a Medrol Dosepak as well as Naprosyn and Keflex.     Laboratory analysis 2021  Strep antigen negative  Monospot negative  Urinalysis negative  Blood culture no growth 6 days  Lactic acid normal  Metabolic panel sodium 383 and glucose 116 otherwise unremarkable  CBC unremarkable with white count 9.4    Laboratory analysis from November 16,2021  Blood cultures no growth 6 days  Metabolic panel unremarkable  CBC unremarkable with white count 8.2    CT of the head done 11/16/2021 with a tiny area of encephalomalacia in the right thalamus  CT scan of the neck 11/16/2021 with bilateral lymphadenopathy    Laboratory analysis July 2021  Lipid panel with     Note with ENT today , finds history as above recounted improvement was noted and plan was for close monitoring. He was to come back in 3 to 4 weeks. If no improvement FNA was plan. Past Medical History:   Diagnosis Date    Chronic obstructive pulmonary disease ([address]     Shoulder disorder        Past Surgical History:   Procedure Laterality Date    HX HERNIA REPAIR      x 2        Current Outpatient Medications   Medication Sig Dispense Refill    tiZANidine (ZANAFLEX) 2 mg capsule Take 2 mg by mouth three (3) times daily.  gabapentin (NEURONTIN) 100 mg capsule Take  by mouth three (3) times daily.  levothyroxine (SYNTHROID) 50 mcg tablet TAKE 1 TABLET BY MOUTH EVERY DAY BEFORE BREAKFAST 90 Tablet 1    diclofenac EC (VOLTAREN) 75 mg EC tablet Take 1 Tablet by mouth two (2) times a day.  carbinoxamine maleate 4 mg tab Take 1 Tab by mouth two (2) times a day. Indications: allergic conjunctivitis 60 Tab 5    albuterol (ProAir HFA) 90 mcg/actuation inhaler Take 1 Puff by inhalation every four (4) hours as needed for Wheezing or Shortness of Breath. 1 Inhaler 3    albuterol (PROVENTIL VENTOLIN) 2.5 mg /3 mL (0.083 %) nebu Take 3 mL by inhalation every four (4) hours as needed for Wheezing. 60 Nebule 3    traZODone (DESYREL) 50 mg tablet Take 1 Tablet by mouth nightly.  (Patient not taking: Reported on 12/14/2021) 30 Tablet 5    methylPREDNISolone (Medrol, Berny,) 4 mg tablet Per dose pack instructions (Patient not taking: Reported on 12/14/2021) 1 Dose Pack 0    naproxen (NAPROSYN) 500 mg tablet Take 1 Tablet by mouth every twelve (12) hours as needed for Pain. (Patient not taking: Reported on 12/14/2021) 20 Tablet 0        Allergies   Allergen Reactions    Hydrocodone Nausea and Vomiting    Voltaren [Diclofenac Sodium] Other (comments)     Stomach cramps       Social History     Tobacco Use    Smoking status: Former Smoker    Smokeless tobacco: Former User   Substance Use Topics    Alcohol use: Yes     Comment: rare     Drug use: No       Family History   Problem Relation Age of Onset    Alcohol abuse Father        Review of Systems  Pertinent positives and negatives as noted. Examination  Visit Vitals  /74 (BP 1 Location: Left upper arm, BP Patient Position: Sitting)   Pulse 74   Resp 16   Ht 5' 10\" (1.778 m)   Wt 88 kg (194 lb)   SpO2 99%   BMI 27.84 kg/m²     Neurologically, he is awake, alert, oriented with normal speech, language, and cognition. Cranial nerves intact 2-12. He has normal bulk and tone. There is no abnormal movement. There is no pronation or drift. He is able to generate full strength in the upper and lower extremities and all muscle groups to direct confrontational testing. Reflexes are symmetrical in the upper and lower extremities bilaterally. No pathologic reflexes are elicited. He has no ataxia or past pointing.      Impression/Plan  68-year-old gentleman with a very tiny area of encephalomalacia in the right thalamus  No clinical history of stroke  Discussed that this can just be normal aging versus old congenital versus other  He certainly has not had any clinical symptomology to suggest this is a stroke I told him that this would not be considered a stroke per se  I would not tell anyone that I have had a stroke or classify this as a stroke or fill out any type of medical or insurance form saying that I had a stroke this is just a radiographic finding however given his family history I would modify modifiable risk factors for stroke including controlling cholesterol, blood pressure, eating properly, controlling weight etc.  We will clear his carotids today to also modify this risk factors  Follow-up here as needed    Velta Burkitt, MD          This note was created using voice recognition software. Despite editing, there may be syntax errors.

## 2021-12-14 NOTE — PROGRESS NOTES
Visit Vitals  /80 (BP 1 Location: Left upper arm, BP Patient Position: Sitting, BP Cuff Size: Adult)   Pulse 70   Temp 98.1 °F (36.7 °C) (Temporal)   Resp 18   Ht 5' 10\" (1.778 m)   Wt 197 lb (89.4 kg)   SpO2 98%   BMI 28.27 kg/m²     Chief Complaint   Patient presents with    Procedure     FNA

## 2021-12-14 NOTE — PROGRESS NOTES
Subjective:    Jason Armando   60 y.o.   1965     Follow-up visit    Location - neck, throat    Quality - swollen LNs, sore throat    Severity -  moderate    Duration - 3-4 weeks    Timing - ongoing    Context - pt states started with high fevers, sweats, progressed to some mild right sided sore throat and mild difficulty swallowing and then prominent, tender LNs more right sided; went to ED 10 days ago, had CT but overall negative workup including labs; then went again yesterday due to persistent fevers; same overall findings with normal WBC, negative Strep and Mono; was sent home on keflex, prednisone and naproxen; denies much throat pain now;  glands    Modifying Features - none    Associated symptoms/signs - high fever    12/3/2021 -1 week follow-up after his virtual visit last week. Patient states he has completed the antibiotics he is no longer having any fever very minimal soreness. Does feel that the prominent gland in the right neck is regressing. Complains of some mild throat congestion and cough. No sore throat, no dysphagia    12/14/2021 -11-day follow-up, patient states the swollen gland in the right neck is improving considerably. Denies any sore throat. Denies any fever nor body aches no constitutional symptoms    Review of Systems  Review of Systems   Constitutional: Negative for chills and fever. HENT: Negative for ear pain, hearing loss, nosebleeds, sore throat and tinnitus. Eyes: Negative for blurred vision and double vision. Respiratory: Negative for cough, sputum production and shortness of breath. Cardiovascular: Negative for chest pain and palpitations. Gastrointestinal: Negative for heartburn, nausea and vomiting. Musculoskeletal: Positive for joint pain. Negative for neck pain. Skin: Negative. Neurological: Negative for dizziness, speech change, weakness and headaches. Endo/Heme/Allergies: Negative for environmental allergies.  Does not bruise/bleed easily. Psychiatric/Behavioral: Negative for memory loss. The patient does not have insomnia. Past Medical History:   Diagnosis Date    Chronic obstructive pulmonary disease ([address]     Shoulder disorder      Past Surgical History:   Procedure Laterality Date    HX HERNIA REPAIR      x 2       Family History   Problem Relation Age of Onset    Alcohol abuse Father      Social History     Tobacco Use    Smoking status: Former Smoker    Smokeless tobacco: Former User   Substance Use Topics    Alcohol use: Yes     Comment: rare       Prior to Admission medications    Medication Sig Start Date End Date Taking? Authorizing Provider   traZODone (DESYREL) 50 mg tablet Take 1 Tablet by mouth nightly. 11/29/21   Bimal Mckeon MD   methylPREDNISolone (Medrol, Berny,) 4 mg tablet Per dose pack instructions 11/25/21   Sherald Spurling, MD   naproxen (NAPROSYN) 500 mg tablet Take 1 Tablet by mouth every twelve (12) hours as needed for Pain. 11/25/21   Sherald Spurling, MD   tiZANidine (ZANAFLEX) 2 mg capsule Take 2 mg by mouth three (3) times daily. Taz Lyle MD   gabapentin (NEURONTIN) 100 mg capsule Take  by mouth three (3) times daily. Taz Lyle MD   levothyroxine (SYNTHROID) 50 mcg tablet TAKE 1 TABLET BY MOUTH EVERY DAY BEFORE BREAKFAST 10/18/21   Bimal Mckeon MD   diclofenac EC (VOLTAREN) 75 mg EC tablet Take 1 Tablet by mouth two (2) times a day. 7/15/21   ProviderMelissa   carbinoxamine maleate 4 mg tab Take 1 Tab by mouth two (2) times a day. Indications: allergic conjunctivitis 5/12/21   Bimal Mckeon MD   albuterol (ProAir HFA) 90 mcg/actuation inhaler Take 1 Puff by inhalation every four (4) hours as needed for Wheezing or Shortness of Breath. 12/31/20   Bimal Mckeon MD   albuterol (PROVENTIL VENTOLIN) 2.5 mg /3 mL (0.083 %) nebu Take 3 mL by inhalation every four (4) hours as needed for Wheezing.  4/6/20   Clarita Vincent NP        Allergies   Allergen Reactions    Hydrocodone Nausea and Vomiting    Voltaren [Diclofenac Sodium] Other (comments)     Stomach cramps         Objective:     Visit Vitals  /80 (BP 1 Location: Left upper arm, BP Patient Position: Sitting, BP Cuff Size: Adult)   Pulse 70   Temp 98.1 °F (36.7 °C) (Temporal)   Resp 18   Ht 5' 10\" (1.778 m)   Wt 197 lb (89.4 kg)   SpO2 98%   BMI 28.27 kg/m²        Physical Exam  Vitals reviewed. Constitutional:       General: He is awake. Appearance: Normal appearance. He is normal weight. HENT:      Head: Normocephalic and atraumatic. Jaw: There is normal jaw occlusion. No trismus, tenderness or malocclusion. Salivary Glands: Right salivary gland is not diffusely enlarged or tender. Left salivary gland is not diffusely enlarged or tender. Right Ear: Hearing, tympanic membrane, ear canal and external ear normal.      Left Ear: Hearing, tympanic membrane, ear canal and external ear normal.      Ears:      Maradiaga exam findings: does not lateralize. Right Rinne: AC > BC. Left Rinne: AC > BC. Nose: Septal deviation (Leftward severe) present. No mucosal edema or rhinorrhea. Right Turbinates: Not enlarged, swollen or pale. Left Turbinates: Not enlarged, swollen or pale. Right Sinus: No maxillary sinus tenderness or frontal sinus tenderness. Left Sinus: No maxillary sinus tenderness or frontal sinus tenderness. Mouth/Throat:      Lips: Pink. Mouth: Mucous membranes are moist. No oral lesions. Dentition: Normal dentition. No gum lesions. Tongue: No lesions. Palate: No mass and lesions. Pharynx: Oropharynx is clear. Uvula midline. Tonsils: No tonsillar exudate. 0 on the right. 0 on the left. Comments: Absent tonsils  Eyes:      General: Vision grossly intact. Extraocular Movements: Extraocular movements intact. Right eye: No nystagmus. Left eye: No nystagmus.       Pupils: Pupils are equal, round, and reactive to light. Neck:      Thyroid: No thyroid mass, thyromegaly or thyroid tenderness. Trachea: Trachea and phonation normal. No tracheal tenderness. Comments: Palpable right level 3 lymph node just adjacent to and below the hyoid, soft, approximate 1.5 cm  Cardiovascular:      Rate and Rhythm: Normal rate and regular rhythm. Pulmonary:      Effort: Pulmonary effort is normal.      Breath sounds: Normal breath sounds. No stridor. No wheezing. Musculoskeletal:         General: Normal range of motion. Cervical back: Normal range of motion. No edema or erythema. Lymphadenopathy:      Cervical: Cervical adenopathy present. Skin:     General: Skin is warm and dry. Neurological:      General: No focal deficit present. Mental Status: He is alert and oriented to person, place, and time. Mental status is at baseline. Cranial Nerves: Cranial nerves are intact. Coordination: Romberg sign negative. Gait: Gait is intact. Psychiatric:         Mood and Affect: Mood normal.         Behavior: Behavior normal. Behavior is cooperative. Assessment/Plan:     Encounter Diagnoses   Name Primary?  Cervical lymphadenitis Yes    DNS (deviated nasal septum)      I have reviewed Ct imaging personally. I have also reviewed ED notes and labwork done. He had a recent TSH also done which was normal.    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 He has finished with antibiotics, steroids, NSAIDs. No obvious source seen on his CT scan or on clinical exam today. He states symptoms have been around 1 month. Plan was for FNA if the lymph node was not improving. Since there is improvement noted we will continue close monitoring at this point. I have asked the patient to see back in 3 to 4 weeks and will decide at that point whether further work-up is warranted. Follow-up and Dispositions    · Return for 3-4 weeks followup double book andrew Aviles Carlos Hidalgo MD, [address] [facility]    [address]  [place] [facility]    [address] [id number]  [place] [address] [facility] [address] [place] [address] [place] [address]

## 2022-01-24 ENCOUNTER — OFFICE VISIT (OUTPATIENT)
Dept: ENT CLINIC | Age: 57
End: 2022-01-24
Payer: COMMERCIAL

## 2022-01-24 VITALS
WEIGHT: 194 LBS | SYSTOLIC BLOOD PRESSURE: 134 MMHG | OXYGEN SATURATION: 98 % | BODY MASS INDEX: 27.77 KG/M2 | DIASTOLIC BLOOD PRESSURE: 84 MMHG | TEMPERATURE: 98.2 F | HEIGHT: 70 IN | RESPIRATION RATE: 18 BRPM | HEART RATE: 66 BPM

## 2022-01-24 DIAGNOSIS — J34.2 DNS (DEVIATED NASAL SEPTUM): ICD-10-CM

## 2022-01-24 DIAGNOSIS — I88.9 CERVICAL LYMPHADENITIS: Primary | ICD-10-CM

## 2022-01-24 DIAGNOSIS — G47.30 SLEEP DISORDER BREATHING: ICD-10-CM

## 2022-01-24 PROCEDURE — 99214 OFFICE O/P EST MOD 30 MIN: CPT | Performed by: OTOLARYNGOLOGY

## 2022-01-24 NOTE — PROGRESS NOTES
Subjective:    Erica Samayoa   60 y.o.   1965     Follow-up visit    Location - neck, throat    Quality - swollen LNs, sore throat    Severity -  moderate    Duration - 3-4 weeks    Timing - ongoing    Context - pt states started with high fevers, sweats, progressed to some mild right sided sore throat and mild difficulty swallowing and then prominent, tender LNs more right sided; went to ED 10 days ago, had CT but overall negative workup including labs; then went again yesterday due to persistent fevers; same overall findings with normal WBC, negative Strep and Mono; was sent home on keflex, prednisone and naproxen; denies much throat pain now;  glands    Modifying Features - none    Associated symptoms/signs - high fever    12/3/2021 -1 week follow-up after his virtual visit last week. Patient states he has completed the antibiotics he is no longer having any fever very minimal soreness. Does feel that the prominent gland in the right neck is regressing. Complains of some mild throat congestion and cough. No sore throat, no dysphagia    12/14/2021 -11-day follow-up, patient states the swollen gland in the right neck is improving considerably. Denies any sore throat. Denies any fever nor body aches no constitutional symptoms    1/24/2022 -6-week follow-up - pt states no futher symptoms, gland is no longer present - he feels physically back to normal, no fatigue, no throat symptoms - he does feel that his nose is blocked and breathing is tough at night; he may have sleep apnea symptoms    Review of Systems  Review of Systems   Constitutional: Negative for chills and fever. HENT: Positive for congestion. Negative for ear pain, hearing loss, nosebleeds, sore throat and tinnitus. Eyes: Negative for blurred vision and double vision. Respiratory: Negative for cough, sputum production and shortness of breath. Cardiovascular: Negative for chest pain and palpitations. Gastrointestinal: Negative for heartburn, nausea and vomiting. Musculoskeletal: Positive for joint pain. Negative for neck pain. Skin: Negative. Neurological: Negative for dizziness, speech change, weakness and headaches. Endo/Heme/Allergies: Negative for environmental allergies. Does not bruise/bleed easily. Psychiatric/Behavioral: Negative for memory loss. The patient does not have insomnia. Past Medical History:   Diagnosis Date    Chronic obstructive pulmonary disease ([address]     Shoulder disorder      Past Surgical History:   Procedure Laterality Date    HX HERNIA REPAIR      x 2       Family History   Problem Relation Age of Onset    Alcohol abuse Father      Social History     Tobacco Use    Smoking status: Former Smoker    Smokeless tobacco: Former User   Substance Use Topics    Alcohol use: Yes     Comment: rare       Prior to Admission medications    Medication Sig Start Date End Date Taking? Authorizing Provider   traZODone (DESYREL) 50 mg tablet Take 1 Tablet by mouth nightly. Patient not taking: Reported on 12/14/2021 11/29/21   Michelle Dodd MD   methylPREDNISolone (Medrol, Berny,) 4 mg tablet Per dose pack instructions  Patient not taking: Reported on 12/14/2021 11/25/21   Isabel Shaw MD   naproxen (NAPROSYN) 500 mg tablet Take 1 Tablet by mouth every twelve (12) hours as needed for Pain. Patient not taking: Reported on 12/14/2021 11/25/21   Isabel Shaw MD   tiZANidine (ZANAFLEX) 2 mg capsule Take 2 mg by mouth three (3) times daily. Taz Lyle MD   gabapentin (NEURONTIN) 100 mg capsule Take  by mouth three (3) times daily. Taz Lyle MD   levothyroxine (SYNTHROID) 50 mcg tablet TAKE 1 TABLET BY MOUTH EVERY DAY BEFORE BREAKFAST 10/18/21   Michelle Dodd MD   diclofenac EC (VOLTAREN) 75 mg EC tablet Take 1 Tablet by mouth two (2) times a day. 7/15/21   Provider, Historical   carbinoxamine maleate 4 mg tab Take 1 Tab by mouth two (2) times a day. Indications: allergic conjunctivitis 5/12/21   Carissa Tejeda MD   albuterol (ProAir HFA) 90 mcg/actuation inhaler Take 1 Puff by inhalation every four (4) hours as needed for Wheezing or Shortness of Breath. 12/31/20   Carissa Tejeda MD   albuterol (PROVENTIL VENTOLIN) 2.5 mg /3 mL (0.083 %) nebu Take 3 mL by inhalation every four (4) hours as needed for Wheezing. 4/6/20   Joselito Chamber, NP        Allergies   Allergen Reactions    Hydrocodone Nausea and Vomiting    Voltaren [Diclofenac Sodium] Other (comments)     Stomach cramps         Objective:     Visit Vitals  /84 (BP 1 Location: Left upper arm, BP Patient Position: Sitting, BP Cuff Size: Adult)   Pulse 66   Temp 98.2 °F (36.8 °C) (Temporal)   Resp 18   Ht 5' 10\" (1.778 m)   Wt 194 lb (88 kg)   SpO2 98%   BMI 27.84 kg/m²        Physical Exam  Vitals reviewed. Constitutional:       General: He is awake. Appearance: Normal appearance. He is normal weight. HENT:      Head: Normocephalic and atraumatic. Jaw: There is normal jaw occlusion. No trismus, tenderness or malocclusion. Salivary Glands: Right salivary gland is not diffusely enlarged or tender. Left salivary gland is not diffusely enlarged or tender. Right Ear: Hearing, tympanic membrane, ear canal and external ear normal.      Left Ear: Hearing, tympanic membrane, ear canal and external ear normal.      Ears:      Maradiaga exam findings: does not lateralize. Right Rinne: AC > BC. Left Rinne: AC > BC. Nose: Septal deviation (Leftward severe) present. No mucosal edema or rhinorrhea. Right Turbinates: Not enlarged, swollen or pale. Left Turbinates: Not enlarged, swollen or pale. Right Sinus: No maxillary sinus tenderness or frontal sinus tenderness. Left Sinus: No maxillary sinus tenderness or frontal sinus tenderness. Mouth/Throat:      Lips: Pink. Mouth: Mucous membranes are moist. No oral lesions.       Dentition: Normal dentition. No gum lesions. Tongue: No lesions. Palate: No mass and lesions. Pharynx: Oropharynx is clear. Uvula midline. Tonsils: No tonsillar exudate. 0 on the right. 0 on the left. Comments: Absent tonsils  Eyes:      General: Vision grossly intact. Extraocular Movements: Extraocular movements intact. Right eye: No nystagmus. Left eye: No nystagmus. Pupils: Pupils are equal, round, and reactive to light. Neck:      Thyroid: No thyroid mass, thyromegaly or thyroid tenderness. Trachea: Trachea and phonation normal. No tracheal tenderness. Cardiovascular:      Rate and Rhythm: Normal rate and regular rhythm. Pulmonary:      Effort: Pulmonary effort is normal.      Breath sounds: Normal breath sounds. No stridor. No wheezing. Musculoskeletal:         General: Normal range of motion. Cervical back: Normal range of motion. No edema or erythema. Lymphadenopathy:      Cervical: No cervical adenopathy. Skin:     General: Skin is warm and dry. Neurological:      General: No focal deficit present. Mental Status: He is alert and oriented to person, place, and time. Mental status is at baseline. Cranial Nerves: Cranial nerves are intact. Coordination: Romberg sign negative. Gait: Gait is intact. Psychiatric:         Mood and Affect: Mood normal.         Behavior: Behavior normal. Behavior is cooperative. Assessment/Plan:     Encounter Diagnoses   Name Primary?  Cervical lymphadenitis Yes    DNS (deviated nasal septum)     Sleep disorder breathing      Cervical lymphadenitis has resolved. Patient is reassured. Follow-up again if he feels any recurrence. He has a severe deviated septum and snoring and difficulty breathing at night and his wife has complained he may stop breathing at times. We suggested considering a sleep study or at least septoplasty procedure and then possibly a sleep study thereafter.   He wants to have nasal surgery but may need to have shoulder surgery for so he will contact me when he is interested. Stefan Javier MD, [address] [facility]    [address]  [employer], [facility]    [address] [id number] [facility] [address]  [place], [place] [address]

## 2022-01-24 NOTE — PROGRESS NOTES
Visit Vitals  /84 (BP 1 Location: Left upper arm, BP Patient Position: Sitting, BP Cuff Size: Adult)   Pulse 66   Temp 98.2 °F (36.8 °C) (Temporal)   Resp 18   Ht 5' 10\" (1.778 m)   Wt 194 lb (88 kg)   SpO2 98%   BMI 27.84 kg/m²     Chief Complaint   Patient presents with    Follow-up     Cervical lymphadenitis

## 2022-03-18 PROBLEM — I88.9 CERVICAL LYMPHADENITIS: Status: ACTIVE | Noted: 2021-11-26

## 2022-03-19 PROBLEM — I63.81 LACUNAR INFARCTION (HCC): Status: ACTIVE | Noted: 2021-11-29

## 2022-03-19 PROBLEM — J34.2 DNS (DEVIATED NASAL SEPTUM): Status: ACTIVE | Noted: 2021-12-03

## 2022-03-20 PROBLEM — R14.0 ABDOMINAL BLOATING: Status: ACTIVE | Noted: 2021-11-29

## 2022-03-20 PROBLEM — B34.9 ACUTE VIRAL SYNDROME: Status: ACTIVE | Noted: 2021-11-26

## 2022-03-24 ENCOUNTER — VIRTUAL VISIT (OUTPATIENT)
Dept: FAMILY MEDICINE CLINIC | Age: 57
End: 2022-03-24
Payer: COMMERCIAL

## 2022-03-24 DIAGNOSIS — K08.89 TOOTH PAIN: ICD-10-CM

## 2022-03-24 DIAGNOSIS — J06.9 ACUTE UPPER RESPIRATORY INFECTION: Primary | ICD-10-CM

## 2022-03-24 DIAGNOSIS — J30.1 ALLERGIC RHINITIS DUE TO POLLEN, UNSPECIFIED SEASONALITY: ICD-10-CM

## 2022-03-24 PROCEDURE — 99213 OFFICE O/P EST LOW 20 MIN: CPT | Performed by: FAMILY MEDICINE

## 2022-03-24 RX ORDER — PREDNISONE 10 MG/1
TABLET ORAL
Qty: 1 DOSE PACK | Refills: 0 | Status: SHIPPED | OUTPATIENT
Start: 2022-03-24 | End: 2022-10-28 | Stop reason: ALTCHOICE

## 2022-03-24 RX ORDER — CARBINOXAMINE MALEATE 4 MG/1
1 TABLET ORAL 2 TIMES DAILY
Qty: 60 TABLET | Refills: 5 | Status: SHIPPED | OUTPATIENT
Start: 2022-03-24 | End: 2022-07-18 | Stop reason: SDUPTHER

## 2022-03-24 RX ORDER — CEPHALEXIN 500 MG/1
1000 CAPSULE ORAL 2 TIMES DAILY
Qty: 40 CAPSULE | Refills: 0 | Status: SHIPPED | OUTPATIENT
Start: 2022-03-24 | End: 2022-04-03

## 2022-03-24 NOTE — PROGRESS NOTES
Consent: Gogo Estrada, who was seen by synchronous (real-time) audio-video technology, and/or his healthcare decision maker, is aware that this patient-initiated, Telehealth encounter on 3/24/2022 is a billable service, with coverage as determined by his insurance carrier. He is aware that he may receive a bill and has provided verbal consent to proceed: YES-Consent obtained within past 12 months  712    Prior to Admission medications    Medication Sig Start Date End Date Taking? Authorizing Provider   cephALEXin (KEFLEX) 500 mg capsule Take 2 Capsules by mouth two (2) times a day for 10 days. 3/24/22 4/3/22 Yes June MD Virgen   predniSONE [facility] DS) 10 mg dose pack 6 Day DS taper pack as directed 3/24/22  Yes June MD Virgen   carbinoxamine maleate 4 mg tab Take 1 Tablet by mouth two (2) times a day. Indications: allergic conjunctivitis 3/24/22  Yes June MD Virgen   traZODone (DESYREL) 50 mg tablet Take 1 Tablet by mouth nightly. Patient not taking: Reported on 12/14/2021 11/29/21 June MD Virgen   methylPREDNISolone (Medrol, Berny,) 4 mg tablet Per dose pack instructions  Patient not taking: Reported on 12/14/2021 11/25/21   Bre Chow MD   naproxen (NAPROSYN) 500 mg tablet Take 1 Tablet by mouth every twelve (12) hours as needed for Pain. Patient not taking: Reported on 12/14/2021 11/25/21   Bre Chow MD   tiZANidine (ZANAFLEX) 2 mg capsule Take 2 mg by mouth three (3) times daily. Taz Lyle MD   gabapentin (NEURONTIN) 100 mg capsule Take  by mouth three (3) times daily. Taz Lyle MD   levothyroxine (SYNTHROID) 50 mcg tablet TAKE 1 TABLET BY MOUTH EVERY DAY BEFORE BREAKFAST 10/18/21   Vanna MD Virgen   diclofenac EC (VOLTAREN) 75 mg EC tablet Take 1 Tablet by mouth two (2) times a day. 7/15/21   ProviderMelissa   carbinoxamine maleate 4 mg tab Take 1 Tab by mouth two (2) times a day.  Indications: allergic conjunctivitis 5/12/21 3/24/22  Mark Limb, Clare Busch MD   albuterol (ProAir HFA) 90 mcg/actuation inhaler Take 1 Puff by inhalation every four (4) hours as needed for Wheezing or Shortness of Breath. 12/31/20   Afshan Thomas MD   albuterol (PROVENTIL VENTOLIN) 2.5 mg /3 mL (0.083 %) nebu Take 3 mL by inhalation every four (4) hours as needed for Wheezing. 4/6/20   Leobardo Gums, NP     Allergies   Allergen Reactions    Hydrocodone Nausea and Vomiting    Voltaren [Diclofenac Sodium] Other (comments)     Stomach cramps           Assessment & Plan:   Diagnoses and all orders for this visit:    1. Acute upper respiratory infection  -     cephALEXin (KEFLEX) 500 mg capsule; Take 2 Capsules by mouth two (2) times a day for 10 days. -     predniSONE (STERAPRED DS) 10 mg dose pack; 6 Day DS taper pack as directed  Patient has 3 to 4-week history of productive cough and increased wheezing even on his nebulizers. Add medicine as above return to office if not better. 2. Allergic rhinitis due to pollen, unspecified seasonality  -     carbinoxamine maleate 4 mg tab; Take 1 Tablet by mouth two (2) times a day. Indications: allergic conjunctivitis    3. Tooth pain  Refer back to dentist for chronic teeth issues. Medication Side Effects and Warnings were discussed with patient  Patient Labs were reviewed and or requested:  Patient Past Records were reviewed and or requested              We discussed the expected course, resolution and complications of the diagnosis(es) in detail. Medication risks, benefits, costs, interactions, and alternatives were discussed as indicated. I advised him to contact the office if his condition worsens, changes or fails to improve as anticipated. He expressed understanding with the diagnosis(es) and plan. Denisse Jerome, was evaluated through a synchronous (real-time) audio-video encounter. The patient (or guardian if applicable) is aware that this is a billable service, which includes applicable co-pays.  This Virtual Visit was conducted with patient's (and/or legal guardian's) consent. The visit was conducted pursuant to the emergency declaration under the [facility] [address] authority and the [employer] Act. Patient identification was verified, and a caregiver was present when appropriate. The patient was located in a state where the provider was licensed to provide care. Services were provided through a video synchronous discussion virtually to substitute for in-person clinic visit. Patient and provider were located at their individual homes. I have discussed the diagnosis with the patient and the intended plan as seen in the above orders. The patient understands and agrees with the plan. The patient has received an after-visit summary and questions were answered concerning future plans. Medication Side Effects and Warnings were discussed with patient  Patient Labs were reviewed and or requested:  Patient Past Records were reviewed and or requested    Kandace Floyd M.D. There are no Patient Instructions on file for this visit.

## 2022-07-09 DIAGNOSIS — E03.9 HYPOTHYROIDISM, UNSPECIFIED TYPE: ICD-10-CM

## 2022-07-11 RX ORDER — LEVOTHYROXINE SODIUM 50 UG/1
TABLET ORAL
Qty: 90 TABLET | Refills: 1 | Status: SHIPPED | OUTPATIENT
Start: 2022-07-11 | End: 2022-07-18 | Stop reason: SDUPTHER

## 2022-07-15 ENCOUNTER — TELEPHONE (OUTPATIENT)
Dept: FAMILY MEDICINE CLINIC | Age: 57
End: 2022-07-15

## 2022-07-15 NOTE — TELEPHONE ENCOUNTER
----- Message from [facility] sent at 7/15/2022  3:56 PM EDT -----  Subject: Refill Request    QUESTIONS  Name of Medication? levothyroxine (SYNTHROID) 50 mcg tablet  Patient-reported dosage and instructions? once a day  How many days do you have left? 1  Preferred Pharmacy? [id number] #[id number]  Pharmacy phone number (if available)? [phone]  Additional Information for Provider? have about 5 days left  ---------------------------------------------------------------------------  --------------,  Name of Medication? carbinoxamine maleate 4 mg tab  Patient-reported dosage and instructions? twice a day  How many days do you have left? 5  Preferred Pharmacy? [facility] #[id number]  Pharmacy phone number (if available)? [phone]  Additional Information for Provider? needs a refill for Albuterol was not   in his profile for medicine but he states he gets this medication has   appointment 8/3/22 @ 2:15P for follow up w/medication refills  ---------------------------------------------------------------------------  --------------  [address]  What is the best way for the office to contact you? OK to leave message on   voicemail, OK to respond with electronic message via [facility] portal (only   for patients who have registered [facility] account)  Preferred Call Back Phone Number? [phone]  ---------------------------------------------------------------------------  --------------  SCRIPT ANSWERS  Relationship to Patient?  Self

## 2022-07-18 DIAGNOSIS — E03.9 HYPOTHYROIDISM, UNSPECIFIED TYPE: ICD-10-CM

## 2022-07-18 DIAGNOSIS — J30.1 ALLERGIC RHINITIS DUE TO POLLEN, UNSPECIFIED SEASONALITY: ICD-10-CM

## 2022-07-18 RX ORDER — LEVOTHYROXINE SODIUM 50 UG/1
50 TABLET ORAL
Qty: 90 TABLET | Refills: 1 | Status: SHIPPED | OUTPATIENT
Start: 2022-07-18 | End: 2022-10-28 | Stop reason: SDUPTHER

## 2022-07-18 RX ORDER — CARBINOXAMINE MALEATE 4 MG/1
1 TABLET ORAL 2 TIMES DAILY
Qty: 60 TABLET | Refills: 5 | Status: SHIPPED | OUTPATIENT
Start: 2022-07-18

## 2022-07-20 DIAGNOSIS — J40 BRONCHITIS: ICD-10-CM

## 2022-07-20 DIAGNOSIS — J44.9 CHRONIC OBSTRUCTIVE PULMONARY DISEASE, UNSPECIFIED COPD TYPE (HCC): ICD-10-CM

## 2022-07-20 RX ORDER — ALBUTEROL SULFATE 90 UG/1
1 AEROSOL, METERED RESPIRATORY (INHALATION)
Qty: 1 EACH | Refills: 3 | Status: SHIPPED | OUTPATIENT
Start: 2022-07-20 | End: 2022-10-28 | Stop reason: SDUPTHER

## 2022-10-27 ENCOUNTER — TELEPHONE (OUTPATIENT)
Dept: FAMILY MEDICINE CLINIC | Age: 57
End: 2022-10-27

## 2022-10-27 NOTE — TELEPHONE ENCOUNTER
----- Message from Cinthia Ibarra sent at 10/27/2022 10:38 AM EDT -----  Subject: Appointment Request    Reason for Call: Established Patient Appointment needed: Routine Existing   Condition Follow Up    QUESTIONS    Reason for appointment request? Available appointments did not meet   patient need     Additional Information for Provider? Pt would like to schedule a fit in   appt to follow up for breathing problems and to discuss anxiety. Pt thinks   he has bronchitis/asthma and stated that his nebulizer isn't working.  Pls   give the pt a call.  ---------------------------------------------------------------------------  --------------  Anastacia SYED  [phone]; OK to leave message on voicemail  ---------------------------------------------------------------------------  --------------  SCRIPT ANSWERS  COVID Screen: Jina Recinos

## 2022-10-28 ENCOUNTER — OFFICE VISIT (OUTPATIENT)
Dept: FAMILY MEDICINE CLINIC | Age: 57
End: 2022-10-28
Payer: COMMERCIAL

## 2022-10-28 VITALS
HEIGHT: 70 IN | DIASTOLIC BLOOD PRESSURE: 94 MMHG | WEIGHT: 176 LBS | TEMPERATURE: 98.3 F | RESPIRATION RATE: 16 BRPM | OXYGEN SATURATION: 98 % | BODY MASS INDEX: 25.2 KG/M2 | HEART RATE: 66 BPM | SYSTOLIC BLOOD PRESSURE: 144 MMHG

## 2022-10-28 DIAGNOSIS — J44.1 ASTHMA WITH COPD WITH EXACERBATION (HCC): Primary | ICD-10-CM

## 2022-10-28 DIAGNOSIS — E03.9 HYPOTHYROIDISM, UNSPECIFIED TYPE: ICD-10-CM

## 2022-10-28 DIAGNOSIS — J45.901 ASTHMA WITH COPD WITH EXACERBATION (HCC): Primary | ICD-10-CM

## 2022-10-28 PROCEDURE — 99214 OFFICE O/P EST MOD 30 MIN: CPT | Performed by: NURSE PRACTITIONER

## 2022-10-28 RX ORDER — BUDESONIDE AND FORMOTEROL FUMARATE DIHYDRATE 160; 4.5 UG/1; UG/1
2 AEROSOL RESPIRATORY (INHALATION) 2 TIMES DAILY
Qty: 10.2 G | Refills: 2 | Status: SHIPPED | OUTPATIENT
Start: 2022-10-28

## 2022-10-28 RX ORDER — ALBUTEROL SULFATE 90 UG/1
1 AEROSOL, METERED RESPIRATORY (INHALATION)
Qty: 1 EACH | Refills: 1 | Status: SHIPPED | OUTPATIENT
Start: 2022-10-28

## 2022-10-28 RX ORDER — LEVOTHYROXINE SODIUM 50 UG/1
50 TABLET ORAL
Qty: 30 TABLET | Refills: 0 | Status: SHIPPED | OUTPATIENT
Start: 2022-10-28

## 2022-10-28 RX ORDER — PREDNISONE 10 MG/1
TABLET ORAL
Qty: 30 TABLET | Refills: 0 | Status: SHIPPED | OUTPATIENT
Start: 2022-10-28

## 2022-10-28 RX ORDER — ALBUTEROL SULFATE 0.83 MG/ML
2.5 SOLUTION RESPIRATORY (INHALATION)
Qty: 75 EACH | Refills: 1 | Status: SHIPPED | OUTPATIENT
Start: 2022-10-28

## 2022-10-28 NOTE — PROGRESS NOTES
Assessment/Plan:   1. Asthma with COPD with exacerbation ([facility] [address]  Add steroid taper-  meds on the way home and take today's dose right away  Add ICA/LABA  Albuterol nebs q4-6 hours today and tomorrow, taper as tolerated  Patient agrees if symptoms worsen at all he will go to ED right away. We discussed severe asthma/copd symptoms can be life-threatening, importance of seeking urgent medical attention promptly for any worsening  -     predniSONE (DELTASONE) 10 mg tablet; Day 1-2 take 5 tabs, day 3-4 take 4 tabs, day 5-6 take 3 tabs, day 7-8 take 2 tabs, day 9-10 take 1 tab, Normal, Disp-30 Tablet, R-0  -     albuterol (ProAir HFA) 90 mcg/actuation inhaler; Take 1 Puff by inhalation every four (4) hours as needed for Wheezing or Shortness of Breath., Normal, Disp-1 Each, R-1  -     albuterol (PROVENTIL VENTOLIN) 2.5 mg /3 mL (0.083 %) nebu; Take 3 mL by inhalation every four (4) hours as needed for Wheezing., Normal, Disp-75 Each, R-1  -     budesonide-formoteroL (SYMBICORT) 160-4.5 mcg/actuation HFAA; Take 2 Puffs by inhalation two (2) times a day., Normal, Disp-10.2 g, R-2    3. Hypothyroidism, unspecified type  Last TSH at goal, nov 2021, due for follow up  Refill rx, schedule appt with pcp  -     levothyroxine (SYNTHROID) 50 mcg tablet; Take 1 Tablet by mouth Daily (before breakfast). , Normal, Disp-30 Tablet, R-0    The above diagnosis is a acute on chronic problem. We discussed expected course, resolution, and complications of diagnosis in detail. Return in about 2 weeks (around 11/11/2022) for asthma/copd follow up. Subjective:   Anju Torres presents for evaluation of Breathing Problem     This started 2 weeks ago, and is gradually worsening since that time. He also reports rhinorrhea, productive cough, SOB, ARIAS, and wheezing . Cough productive for clear sputum. He denies a history of: fever, myalgias, headache, bilateral sinus pain, and swollen glands.   Treatments have included: albuterol nebulizer/mdi, only modest relief. Relevant PMH: Asthma and COPD. Patient reports sick contacts: no    BP (!) 144/94 (BP 1 Location: Right arm, BP Patient Position: Sitting)   Pulse 66   Temp 98.3 °F (36.8 °C) (Oral)   Resp 16   Ht 5' 10\" (1.778 m)   Wt 176 lb (79.8 kg)   SpO2 98%   BMI 25.25 kg/m²      Physical Exam  General: alert, cooperative, appears stated age  Eye exam: negative  Ear exam: normal TM's and external ear canals AU  Sinus exam: Normal paranasal sinuses without tenderness  Oropharynx exam: Lips, mucosa, and tongue normal. Teeth and gums normal  Neck exam: supple, symmetrical, trachea midline, no adenopathy, thyroid: not enlarged, symmetric, no tenderness/mass/nodules, no carotid bruit, and no JVD  Heart exam: normal rate, regular rhythm, normal S1, S2, no murmurs, rubs, clicks or gallops  Lung exam: wheezes expiratory in all lung fields. No rhonchi or rales. Symmetric air entry    An electronic signature was used to authenticate this note.   -- Carlos Carrillo NP  10/28/2022

## 2022-10-28 NOTE — PROGRESS NOTES
Chief Complaint   Patient presents with    Breathing Problem     Patient presents in office today with c/o cough and congestion for about 2 weeks. States that he has been having a hard time breathing. States that he has been doing neb treatments and they aren't helping. Also has c/o increased anxiety. States that he is extremely fidgety and can not stay still for long periods of time. Also has c/o rapid HR and feeling shaky. Has c/o headaches. Also states that he has loss of appetite. No other concerns. 1. Have you been to the ER, urgent care clinic since your last visit? Hospitalized since your last visit? No    2. Have you seen or consulted any other health care providers outside of the [address] [place] [address] since your last visit? Include any pap smears or colon screening.  No    Learning Assessment 6/1/2018   PRIMARY LEARNER Patient   HIGHEST LEVEL OF EDUCATION - PRIMARY LEARNER  GRADUATED HIGH SCHOOL OR GED   BARRIERS PRIMARY LEARNER -   CO-LEARNER CAREGIVER -   PRIMARY LANGUAGE ENGLISH   LEARNER PREFERENCE PRIMARY DEMONSTRATION   LEARNING SPECIAL TOPICS -   ANSWERED BY patient   RELATIONSHIP SELF

## 2022-12-20 ENCOUNTER — OFFICE VISIT (OUTPATIENT)
Dept: FAMILY MEDICINE CLINIC | Age: 57
End: 2022-12-20
Payer: COMMERCIAL

## 2022-12-20 VITALS
WEIGHT: 187 LBS | SYSTOLIC BLOOD PRESSURE: 139 MMHG | TEMPERATURE: 97.8 F | DIASTOLIC BLOOD PRESSURE: 86 MMHG | RESPIRATION RATE: 20 BRPM | BODY MASS INDEX: 26.77 KG/M2 | HEIGHT: 70 IN | HEART RATE: 71 BPM | OXYGEN SATURATION: 97 %

## 2022-12-20 DIAGNOSIS — F90.2 ATTENTION DEFICIT HYPERACTIVITY DISORDER (ADHD), COMBINED TYPE: ICD-10-CM

## 2022-12-20 DIAGNOSIS — G47.00 INSOMNIA, UNSPECIFIED TYPE: Primary | ICD-10-CM

## 2022-12-20 DIAGNOSIS — E03.9 HYPOTHYROIDISM, UNSPECIFIED TYPE: ICD-10-CM

## 2022-12-20 PROCEDURE — 99214 OFFICE O/P EST MOD 30 MIN: CPT | Performed by: FAMILY MEDICINE

## 2022-12-20 RX ORDER — TRAZODONE HYDROCHLORIDE 100 MG/1
100 TABLET ORAL
Qty: 90 TABLET | Refills: 1 | Status: SHIPPED | OUTPATIENT
Start: 2022-12-20

## 2022-12-20 RX ORDER — LEVOTHYROXINE SODIUM 50 UG/1
50 TABLET ORAL
Qty: 90 TABLET | Refills: 1 | Status: SHIPPED | OUTPATIENT
Start: 2022-12-20

## 2022-12-20 NOTE — PROGRESS NOTES
Patient here for asthma, copd follow up x 2 weeks. Anxiety. He can not sleep. Takes trazadone, but not helping. 1. Have you been to the ER, urgent care clinic since your last visit? Hospitalized since your last visit? No    2. Have you seen or consulted any other health care providers outside of the [address] [place] [address] since your last visit? Include any pap smears or colon screening. No           Chief Complaint   Patient presents with    COPD    Asthma     2 week follow up    Insomnia     He is a 59 y.o. male who presents for evalution. Reviewed PmHx, RxHx, FmHx, SocHx, AllgHx and updated and dated in the chart. Patient Active Problem List    Diagnosis    DNS (deviated nasal septum)    Lacunar infarction (HCC)    Abdominal bloating    Cervical lymphadenitis    Acute viral syndrome    Chronic obstructive pulmonary disease (HCC)    Hypothyroidism    Prediabetes    Mixed hyperlipidemia    Insomnia       Review of Systems - negative except as listed above in the HPI    Objective:     Vitals:    12/20/22 1532   BP: 139/86   Pulse: 71   Resp: 20   Temp: 97.8 °F (36.6 °C)   SpO2: 97%   Weight: 187 lb (84.8 kg)   Height: 5' 10\" (1.778 m)         Assessment/ Plan:   Diagnoses and all orders for this visit:    1. Insomnia, unspecified type  -     traZODone (DESYREL) 100 mg tablet; Take 1 Tablet by mouth nightly. Increase to 200 mg at nighttime and may go to 1.5 tablets if not better in a couple weeks. 2. Hypothyroidism, unspecified type  -     levothyroxine (SYNTHROID) 50 mcg tablet; Take 1 Tablet by mouth Daily (before breakfast). -     TSH 3RD GENERATION; Future    3. Attention deficit hyperactivity disorder (ADHD), combined type  -     lisdexamfetamine (VYVANSE) 50 mg cap; Take 1 Capsule by mouth daily. Max Daily Amount: 50 mg. 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 We will start medication recheck in 1 month.        Follow-up and Dispositions Return in about 1 month (around 1/20/2023). I have discussed the diagnosis with the patient and the intended plan as seen in the above orders. The patient understands and agrees with the plan. The patient has received an after-visit summary and questions were answered concerning future plans. Medication Side Effects and Warnings were discussed with patient  Patient Labs were reviewed and or requested:  Patient Past Records were reviewed and or requested    Lavern Gómez M.D. There are no Patient Instructions on file for this visit.

## 2022-12-20 NOTE — PROGRESS NOTES
Patient here for asthma, copd follow up x 2 weeks. Anxiety. He can not sleep. Takes trazadone, but not helping. 1. Have you been to the ER, urgent care clinic since your last visit? Hospitalized since your last visit? No    2. Have you seen or consulted any other health care providers outside of the [address] [place] [address] since your last visit? Include any pap smears or colon screening.  No

## 2022-12-21 LAB — TSH SERPL DL<=0.005 MIU/L-ACNC: 2.99 UIU/ML (ref 0.45–4.5)

## 2022-12-22 ENCOUNTER — TELEPHONE (OUTPATIENT)
Dept: FAMILY MEDICINE CLINIC | Age: 57
End: 2022-12-22

## 2022-12-25 DIAGNOSIS — G47.00 INSOMNIA, UNSPECIFIED TYPE: ICD-10-CM

## 2022-12-26 RX ORDER — TRAZODONE HYDROCHLORIDE 50 MG/1
TABLET ORAL
Qty: 30 TABLET | Refills: 5 | Status: SHIPPED | OUTPATIENT
Start: 2022-12-26

## 2023-01-24 ENCOUNTER — OFFICE VISIT (OUTPATIENT)
Dept: FAMILY MEDICINE CLINIC | Age: 58
End: 2023-01-24
Payer: COMMERCIAL

## 2023-01-24 ENCOUNTER — DOCUMENTATION ONLY (OUTPATIENT)
Dept: FAMILY MEDICINE CLINIC | Age: 58
End: 2023-01-24

## 2023-01-24 VITALS
BODY MASS INDEX: 26.34 KG/M2 | OXYGEN SATURATION: 96 % | HEART RATE: 57 BPM | WEIGHT: 184 LBS | TEMPERATURE: 98.3 F | RESPIRATION RATE: 16 BRPM | HEIGHT: 70 IN | SYSTOLIC BLOOD PRESSURE: 133 MMHG | DIASTOLIC BLOOD PRESSURE: 83 MMHG

## 2023-01-24 DIAGNOSIS — J44.1 ASTHMA WITH COPD WITH EXACERBATION (HCC): ICD-10-CM

## 2023-01-24 DIAGNOSIS — G89.29 CHRONIC LEFT SHOULDER PAIN: ICD-10-CM

## 2023-01-24 DIAGNOSIS — M25.512 CHRONIC LEFT SHOULDER PAIN: ICD-10-CM

## 2023-01-24 DIAGNOSIS — F90.2 ATTENTION DEFICIT HYPERACTIVITY DISORDER (ADHD), COMBINED TYPE: Primary | ICD-10-CM

## 2023-01-24 DIAGNOSIS — J45.901 ASTHMA WITH COPD WITH EXACERBATION (HCC): ICD-10-CM

## 2023-01-24 PROCEDURE — 99213 OFFICE O/P EST LOW 20 MIN: CPT | Performed by: FAMILY MEDICINE

## 2023-01-24 NOTE — PROGRESS NOTES
Chief Complaint   Patient presents with    Shoulder Pain     Left shouder    Attention Deficit Disorder     NOT Taking Vyvanse     1. Have you been to the ER, urgent care clinic since your last visit? Hospitalized since your last visit? No    2. Have you seen or consulted any other health care providers outside of the [address] [place] [address] since your last visit? Include any pap smears or colon screening. Yes Where: [facility]           Chief Complaint   Patient presents with    Shoulder Pain     Left shouder    Attention Deficit Disorder     NOT Taking Vyvanse     He is a 59 y.o. male who presents for evalution. Reviewed PmHx, RxHx, FmHx, SocHx, AllgHx and updated and dated in the chart. Patient Active Problem List    Diagnosis    DNS (deviated nasal septum)    Lacunar infarction (HCC)    Abdominal bloating    Cervical lymphadenitis    Acute viral syndrome    Chronic obstructive pulmonary disease (HCC)    Hypothyroidism    Prediabetes    Mixed hyperlipidemia    Insomnia       Review of Systems - negative except as listed above in the HPI    Objective:     Vitals:    01/24/23 1541   BP: 133/83   Pulse: (!) 57   Resp: 16   Temp: 98.3 °F (36.8 °C)   SpO2: 96%   Weight: 184 lb (83.5 kg)   Height: 5' 10\" (1.778 m)         Assessment/ Plan:   Diagnoses and all orders for this visit:    1. Attention deficit hyperactivity disorder (ADHD), combined type  -     lisdexamfetamine (VYVANSE) 50 mg cap; Take 1 Capsule by mouth daily for 29 days. Max Daily Amount: 50 mg.  -     lisdexamfetamine (VYVANSE) 50 mg cap; Take 1 Capsule by mouth daily for 29 days. Max Daily Amount: 50 mg.  -     lisdexamfetamine (VYVANSE) 50 mg cap; Take 1 Capsule by mouth daily for 29 days. Max Daily Amount: 50 mg. Patient somewhat tolerated medications. Advised to continue with daily dosing and let me know how he is doing. 2. Asthma with COPD with exacerbation ([facility] [address]  Stable  3.  Chronic left stable shoulder pain  -     REFERRAL TO ORTHOPEDICS  Refer to orthopedics for second opinion. He has history of left shoulder replacement with continued issues and problems. Follow-up and Dispositions    Return in about 3 months (around 4/24/2023). I have discussed the diagnosis with the patient and the intended plan as seen in the above orders. The patient understands and agrees with the plan. The patient has received an after-visit summary and questions were answered concerning future plans. Medication Side Effects and Warnings were discussed with patient  Patient Labs were reviewed and or requested:  Patient Past Records were reviewed and or requested    Oj Lopez M.D. There are no Patient Instructions on file for this visit.

## 2023-01-24 NOTE — PROGRESS NOTES
Chief Complaint   Patient presents with    Shoulder Pain     Left shouder    Attention Deficit Disorder     NOT Taking Vyvanse     1. Have you been to the ER, urgent care clinic since your last visit? Hospitalized since your last visit? No    2. Have you seen or consulted any other health care providers outside of the [address] [place] [address] since your last visit? Include any pap smears or colon screening.  Yes Where: [facility]

## 2023-02-23 DIAGNOSIS — E03.9 HYPOTHYROIDISM, UNSPECIFIED TYPE: ICD-10-CM

## 2023-02-23 RX ORDER — LEVOTHYROXINE SODIUM 50 UG/1
TABLET ORAL
Qty: 90 TABLET | Refills: 1 | Status: SHIPPED | OUTPATIENT
Start: 2023-02-23

## 2023-04-19 ENCOUNTER — OFFICE VISIT (OUTPATIENT)
Dept: FAMILY MEDICINE CLINIC | Age: 58
End: 2023-04-19
Payer: COMMERCIAL

## 2023-04-19 VITALS
HEART RATE: 76 BPM | HEIGHT: 70 IN | WEIGHT: 182 LBS | TEMPERATURE: 97.5 F | BODY MASS INDEX: 26.05 KG/M2 | RESPIRATION RATE: 16 BRPM | DIASTOLIC BLOOD PRESSURE: 84 MMHG | OXYGEN SATURATION: 99 % | SYSTOLIC BLOOD PRESSURE: 142 MMHG

## 2023-04-19 DIAGNOSIS — F90.2 ATTENTION DEFICIT HYPERACTIVITY DISORDER (ADHD), COMBINED TYPE: Primary | ICD-10-CM

## 2023-04-19 DIAGNOSIS — F33.9 EPISODE OF RECURRENT MAJOR DEPRESSIVE DISORDER, UNSPECIFIED DEPRESSION EPISODE SEVERITY (HCC): ICD-10-CM

## 2023-04-19 DIAGNOSIS — M25.512 CHRONIC LEFT SHOULDER PAIN: ICD-10-CM

## 2023-04-19 DIAGNOSIS — G89.29 CHRONIC LEFT SHOULDER PAIN: ICD-10-CM

## 2023-04-19 PROCEDURE — 99214 OFFICE O/P EST MOD 30 MIN: CPT | Performed by: FAMILY MEDICINE

## 2023-04-19 RX ORDER — CITALOPRAM 10 MG/1
10 TABLET ORAL DAILY
Qty: 30 TABLET | Refills: 3 | Status: SHIPPED | OUTPATIENT
Start: 2023-04-19

## 2023-04-19 NOTE — PROGRESS NOTES
Chief Complaint   Patient presents with    Shoulder Pain     Left shoulder since Feb 2021    Attention Deficit Disorder     Med eval    Allergies    Depression     1. Have you been to the ER, urgent care clinic since your last visit? Hospitalized since your last visit? No    2. Have you seen or consulted any other health care providers outside of the [address] [place] [address] since your last visit? Include any pap smears or colon screening. No               Chief Complaint   Patient presents with    Shoulder Pain     Left shoulder since Feb 2021    Attention Deficit Disorder     Med eval    Allergies    Depression     He is a 59 y.o. male who presents for evalution. Reviewed PmHx, RxHx, FmHx, SocHx, AllgHx and updated and dated in the chart. Patient Active Problem List    Diagnosis    Attention deficit hyperactivity disorder (ADHD), combined type    DNS (deviated nasal septum)    Lacunar infarction ([facility] [address]    Abdominal bloating    Cervical lymphadenitis    Acute viral syndrome    Chronic obstructive pulmonary disease (HCC)    Hypothyroidism    Prediabetes    Mixed hyperlipidemia    Insomnia       Review of Systems - negative except as listed above in the HPI    Objective:     Vitals:    04/19/23 1438   BP: (!) 142/84   Pulse: 76   Resp: 16   Temp: 97.5 °F (36.4 °C)   TempSrc: Oral   SpO2: 99%   Weight: 182 lb (82.6 kg)   Height: 5' 10\" (1.778 m)         Assessment/ Plan:   Diagnoses and all orders for this visit:    1. Attention deficit hyperactivity disorder (ADHD), combined type  -     Lisdexamfetamine (VYVANSE) 70 mg cap; Take 1 Capsule by mouth daily for 29 days. Max Daily Amount: 70 mg.  -     Lisdexamfetamine (VYVANSE) 70 mg cap; Take 1 Capsule by mouth daily for 29 days. Max Daily Amount: 70 mg.  -     Lisdexamfetamine (VYVANSE) 70 mg cap; Take 1 Capsule by mouth daily for 29 days. Max Daily Amount: 70 mg. Increase attention issues. Some mild improvement to 50 mg.   Will increase dose to 70 mg once daily.  2. Chronic left shoulder pain  -     REFERRAL TO ORTHOPEDICS  Patient did not go see orthopedics as of yet. Increased pain as well as decreased ability work is caused depression. Reinforced patient that he does have value. Add medication below for depression. Refer back to orthopedics for second opinion. 3. Episode of recurrent major depressive disorder, unspecified depression episode severity (HCC)  -     citalopram (CELEXA) 10 mg tablet; Take 1 Tablet by mouth daily. Follow-up and Dispositions    Return in about 3 months (around 7/19/2023). I have discussed the diagnosis with the patient and the intended plan as seen in the above orders. The patient understands and agrees with the plan. The patient has received an after-visit summary and questions were answered concerning future plans. Medication Side Effects and Warnings were discussed with patient  Patient Labs were reviewed and or requested:  Patient Past Records were reviewed and or requested    Tylor Swanson M.D. There are no Patient Instructions on file for this visit.

## 2023-04-19 NOTE — PROGRESS NOTES
Chief Complaint   Patient presents with    Shoulder Pain     Left shoulder since Feb 2021    Attention Deficit Disorder     Med eval    Allergies    Depression     1. Have you been to the ER, urgent care clinic since your last visit? Hospitalized since your last visit? No    2. Have you seen or consulted any other health care providers outside of the [address] [place] [address] since your last visit? Include any pap smears or colon screening.  No

## 2023-05-09 ENCOUNTER — TELEPHONE (OUTPATIENT)
Age: 58
End: 2023-05-09

## 2023-05-09 RX ORDER — FLUOXETINE HYDROCHLORIDE 20 MG/1
20 CAPSULE ORAL DAILY
Qty: 30 CAPSULE | Refills: 3 | Status: SHIPPED | OUTPATIENT
Start: 2023-05-09

## 2023-05-09 NOTE — TELEPHONE ENCOUNTER
Called and spoke with patient. Advised of new RX sent to pharmacy and to d/c Celexa. Patient verbalized understanding.

## 2023-05-09 NOTE — TELEPHONE ENCOUNTER
Pt says the citalopram (CELEXA) 10 mg tablet is not working he says he feels worse and more depressed please advise

## 2023-06-29 ENCOUNTER — TELEPHONE (OUTPATIENT)
Age: 58
End: 2023-06-29

## 2023-06-29 RX ORDER — TRAZODONE HYDROCHLORIDE 100 MG/1
100 TABLET ORAL NIGHTLY
Qty: 90 TABLET | Refills: 1 | Status: SHIPPED | OUTPATIENT
Start: 2023-06-29

## 2023-07-19 ENCOUNTER — OFFICE VISIT (OUTPATIENT)
Age: 58
End: 2023-07-19
Payer: COMMERCIAL

## 2023-07-19 VITALS
BODY MASS INDEX: 24.91 KG/M2 | HEIGHT: 70 IN | OXYGEN SATURATION: 98 % | TEMPERATURE: 98.1 F | RESPIRATION RATE: 18 BRPM | WEIGHT: 174 LBS | DIASTOLIC BLOOD PRESSURE: 82 MMHG | SYSTOLIC BLOOD PRESSURE: 126 MMHG | HEART RATE: 75 BPM

## 2023-07-19 DIAGNOSIS — R73.03 PREDIABETES: ICD-10-CM

## 2023-07-19 DIAGNOSIS — E03.9 ACQUIRED HYPOTHYROIDISM: ICD-10-CM

## 2023-07-19 DIAGNOSIS — F41.9 ANXIETY: Primary | ICD-10-CM

## 2023-07-19 DIAGNOSIS — F90.2 ATTENTION DEFICIT HYPERACTIVITY DISORDER (ADHD), COMBINED TYPE: ICD-10-CM

## 2023-07-19 PROCEDURE — 99214 OFFICE O/P EST MOD 30 MIN: CPT | Performed by: FAMILY MEDICINE

## 2023-07-19 RX ORDER — ALBUTEROL SULFATE 90 UG/1
1 AEROSOL, METERED RESPIRATORY (INHALATION) EVERY 4 HOURS PRN
Qty: 18 G | Refills: 5 | Status: SHIPPED | OUTPATIENT
Start: 2023-07-19

## 2023-07-19 RX ORDER — DESVENLAFAXINE 100 MG/1
100 TABLET, EXTENDED RELEASE ORAL DAILY
Qty: 90 TABLET | Refills: 3 | Status: SHIPPED | OUTPATIENT
Start: 2023-07-19

## 2023-07-19 ASSESSMENT — PATIENT HEALTH QUESTIONNAIRE - PHQ9
2. FEELING DOWN, DEPRESSED OR HOPELESS: 0
SUM OF ALL RESPONSES TO PHQ QUESTIONS 1-9: 0
SUM OF ALL RESPONSES TO PHQ QUESTIONS 1-9: 0
SUM OF ALL RESPONSES TO PHQ9 QUESTIONS 1 & 2: 0
SUM OF ALL RESPONSES TO PHQ QUESTIONS 1-9: 0
1. LITTLE INTEREST OR PLEASURE IN DOING THINGS: 0
SUM OF ALL RESPONSES TO PHQ QUESTIONS 1-9: 0

## 2023-07-20 LAB
ALBUMIN SERPL-MCNC: 4.5 G/DL (ref 3.8–4.9)
ALBUMIN/GLOB SERPL: 1.9 {RATIO} (ref 1.2–2.2)
ALP SERPL-CCNC: 72 IU/L (ref 44–121)
ALT SERPL-CCNC: 16 IU/L (ref 0–44)
AST SERPL-CCNC: 27 IU/L (ref 0–40)
BILIRUB SERPL-MCNC: 0.4 MG/DL (ref 0–1.2)
BUN SERPL-MCNC: 16 MG/DL (ref 6–24)
BUN/CREAT SERPL: 16 (ref 9–20)
CALCIUM SERPL-MCNC: 9.4 MG/DL (ref 8.7–10.2)
CHLORIDE SERPL-SCNC: 101 MMOL/L (ref 96–106)
CO2 SERPL-SCNC: 23 MMOL/L (ref 20–29)
CREAT SERPL-MCNC: 0.97 MG/DL (ref 0.76–1.27)
EGFRCR SERPLBLD CKD-EPI 2021: 90 ML/MIN/1.73
GLOBULIN SER CALC-MCNC: 2.4 G/DL (ref 1.5–4.5)
GLUCOSE SERPL-MCNC: 99 MG/DL (ref 70–99)
HBA1C MFR BLD: 5.8 % (ref 4.8–5.6)
POTASSIUM SERPL-SCNC: 4.1 MMOL/L (ref 3.5–5.2)
PROT SERPL-MCNC: 6.9 G/DL (ref 6–8.5)
SODIUM SERPL-SCNC: 139 MMOL/L (ref 134–144)
TSH SERPL DL<=0.005 MIU/L-ACNC: 2.13 UIU/ML (ref 0.45–4.5)

## 2023-07-21 ENCOUNTER — TELEPHONE (OUTPATIENT)
Age: 58
End: 2023-07-21

## 2023-07-21 DIAGNOSIS — F90.2 ATTENTION DEFICIT HYPERACTIVITY DISORDER (ADHD), COMBINED TYPE: Primary | ICD-10-CM

## 2023-07-21 RX ORDER — LISDEXAMFETAMINE DIMESYLATE 70 MG/1
CAPSULE ORAL
Status: CANCELLED | OUTPATIENT
Start: 2023-07-21

## 2023-07-21 RX ORDER — LISDEXAMFETAMINE DIMESYLATE 70 MG/1
CAPSULE ORAL
COMMUNITY
Start: 2023-06-19

## 2023-07-21 NOTE — TELEPHONE ENCOUNTER
Patient stated when he saw provider he was supposed to have sent over a 70 MG of Vyvanse but 50 MG was sent over.  Please call patient and discuss: [phone]

## 2023-07-24 NOTE — TELEPHONE ENCOUNTER
Returned call to patient. He states Vyvance was started at 50 mg and was increased last Janurary to 70 mg, so 70 mg is his normal dose. He is taking an extra capsule a day, splitting up the contents and taking close to his 75 mg, so he is pretty much using up two capsules/ day. He would like 70 mg Vyvance sent to [address], [place] . Vyvanse 70 mg would not be an increase. And he understands about waiting to see if prestiq works before increasing Vyvance over 70 mg.

## 2023-08-01 ENCOUNTER — TELEPHONE (OUTPATIENT)
Age: 58
End: 2023-08-01

## 2023-08-01 NOTE — TELEPHONE ENCOUNTER
Returned call to patient. Yana Doron)     Patient states that [facility] still does not have 70 mg Vyvanse in stock. Per patient: He has been splitting old 50 mg caps in half to equal 70 mg and is now about out of those. Patient states understanding that capsules are not intended to be split. Requested patient to contact [address] [place] [address] Road to confirm if supply received, if they have alternate dose or if other local [facility] branch has in stock. Patient asks that provider consider alternate dose that has greater availability.

## 2023-08-03 DIAGNOSIS — F90.2 ATTENTION DEFICIT HYPERACTIVITY DISORDER (ADHD), COMBINED TYPE: ICD-10-CM

## 2023-08-03 NOTE — TELEPHONE ENCOUNTER
Received call from patient. Sarah Moody) Patient states he spoke with [facility]. Per pharmacist:     THEY DO HAVE Vyvanse 70 MG IN STOCK. Requesting RX refill to be sent to new pharmacy pended.

## 2023-09-05 ENCOUNTER — TELEPHONE (OUTPATIENT)
Age: 58
End: 2023-09-05

## 2023-09-05 NOTE — TELEPHONE ENCOUNTER
Pt called in and states he has been out of his lisdexamfetamine (VYVANSE) 70 MG capsule since Friday. He states his normal pharmacy and the one in [facility] is out. He is wondering if you would be able to send to another pharmacy?

## 2023-09-06 RX ORDER — METHOCARBAMOL 750 MG/1
1 TABLET, FILM COATED ORAL 3 TIMES DAILY PRN
COMMUNITY
Start: 2023-09-05

## 2023-09-06 NOTE — TELEPHONE ENCOUNTER
Patient would like to speak with nurse about 3 issues. One concerning the muscle relaxer medication and also concerning Vyvanse. He stated he will discuss third when received call.  Phone: [phone]

## 2023-09-07 ENCOUNTER — TELEMEDICINE (OUTPATIENT)
Age: 58
End: 2023-09-07
Payer: COMMERCIAL

## 2023-09-07 DIAGNOSIS — F90.2 ATTENTION DEFICIT HYPERACTIVITY DISORDER (ADHD), COMBINED TYPE: Primary | ICD-10-CM

## 2023-09-07 PROCEDURE — 99213 OFFICE O/P EST LOW 20 MIN: CPT | Performed by: FAMILY MEDICINE

## 2023-09-07 RX ORDER — METHYLPHENIDATE HYDROCHLORIDE 54 MG/1
54 TABLET ORAL DAILY
Qty: 30 TABLET | Refills: 0 | Status: SHIPPED | OUTPATIENT
Start: 2023-10-07 | End: 2023-11-06

## 2023-09-07 RX ORDER — METHYLPHENIDATE HYDROCHLORIDE 54 MG/1
54 TABLET ORAL DAILY
Qty: 30 TABLET | Refills: 0 | Status: SHIPPED | OUTPATIENT
Start: 2023-09-07 | End: 2023-10-07

## 2023-09-07 RX ORDER — LEVOTHYROXINE SODIUM 0.05 MG/1
TABLET ORAL
Qty: 90 TABLET | Refills: 3 | Status: SHIPPED | OUTPATIENT
Start: 2023-09-07

## 2023-09-07 RX ORDER — METHYLPHENIDATE HYDROCHLORIDE 54 MG/1
54 TABLET ORAL DAILY
Qty: 30 TABLET | Refills: 0 | Status: SHIPPED | OUTPATIENT
Start: 2023-11-06 | End: 2023-12-06

## 2023-09-07 ASSESSMENT — PATIENT HEALTH QUESTIONNAIRE - PHQ9
SUM OF ALL RESPONSES TO PHQ QUESTIONS 1-9: 0
SUM OF ALL RESPONSES TO PHQ9 QUESTIONS 1 & 2: 0
SUM OF ALL RESPONSES TO PHQ QUESTIONS 1-9: 0
2. FEELING DOWN, DEPRESSED OR HOPELESS: 0
1. LITTLE INTEREST OR PLEASURE IN DOING THINGS: 0

## 2023-09-07 NOTE — PROGRESS NOTES
Chief Complaint   Patient presents with    Medication Refill         Consent: Gissel Cloud, who was seen by synchronous (real-time) audio-video technology, and/or his healthcare decision maker, is aware that this patient-initiated, Telehealth encounter on 9/7/2023 is a billable service, with coverage as determined by his insurance carrier. He is aware that he may receive a bill and has provided verbal consent to proceed: YES-Consent obtained within past 12 months  712    Prior to Admission medications    Medication Sig Start Date End Date Taking? Authorizing Provider   methylphenidate (CONCERTA) 54 MG extended release tablet Take 1 tablet by mouth daily for 30 days. Max Daily Amount: 54 mg 9/7/23 10/7/23 Yes Thom Yeager MD   methylphenidate (CONCERTA) 54 MG extended release tablet Take 1 tablet by mouth daily for 30 days. Max Daily Amount: 54 mg 10/7/23 11/6/23 Yes Thom Yeager MD   methylphenidate (CONCERTA) 54 MG extended release tablet Take 1 tablet by mouth daily for 30 days.  Max Daily Amount: 54 mg 11/6/23 12/6/23 Yes Thom Yeager MD   levothyroxine (SYNTHROID) 50 MCG tablet TAKE 1 TABLET BY MOUTH DAILY BEFORE AND BREAKFAST 9/7/23  Yes Thom Yeager MD   methocarbamol (ROBAXIN) 750 MG tablet Take 1 tablet by mouth 3 times daily as needed 9/5/23   Historical Provider, MD   albuterol sulfate HFA (PROVENTIL;VENTOLIN;PROAIR) 108 (90 Base) MCG/ACT inhaler Inhale 1 puff into the lungs every 4 hours as needed for Wheezing or Shortness of Breath 7/19/23   Thom Yeager MD   desvenlafaxine succinate (PRISTIQ) 100 MG TB24 extended release tablet Take 1 tablet by mouth daily 7/19/23   Thom Yeager MD   albuterol (PROVENTIL) (2.5 MG/3ML) 0.083% nebulizer solution Inhale 3 mLs into the lungs every 4 hours as needed 10/28/22   Ar Automatic Reconciliation   Carbinoxamine Maleate 4 MG TABS Take 1 tablet by mouth 2 times daily 7/18/22   Ar Automatic Reconciliation   diclofenac (VOLTAREN) 75 MG EC tablet Take 1

## 2023-09-08 ENCOUNTER — TELEPHONE (OUTPATIENT)
Age: 58
End: 2023-09-08

## 2023-09-08 NOTE — TELEPHONE ENCOUNTER
Rec'd call from patient. He states he had a vv with Dr. Marvin Maharaj yesterday. Dr. Marvin Maharaj changed his Vyvance  to concerta because Vyvance was not available, but now Concerta is not available. Informed in a nice way that we have no control over the nationwide back order and we just have to wait for the medications to come in. Patient states understanding.

## 2023-09-19 ENCOUNTER — TELEPHONE (OUTPATIENT)
Age: 58
End: 2023-09-19

## 2023-09-19 DIAGNOSIS — F90.2 ATTENTION DEFICIT HYPERACTIVITY DISORDER (ADHD), COMBINED TYPE: ICD-10-CM

## 2023-09-19 RX ORDER — METHYLPHENIDATE HYDROCHLORIDE 54 MG/1
54 TABLET ORAL DAILY
Qty: 30 TABLET | Refills: 0 | Status: SHIPPED | OUTPATIENT
Start: 2023-09-19 | End: 2023-10-19

## 2023-09-19 NOTE — TELEPHONE ENCOUNTER
Patient would like to speak with nurse about a date in October for medication to be filled. He does not know the name of the medication as it has been changing. Some type of ADD medication.  Patient's phone: [phone]

## 2023-09-19 NOTE — TELEPHONE ENCOUNTER
Returned call to patient. He states pharmacy has concerta finally, but they do not have an order until oct 7. I called pharmacy and spoke to pharmacist Teresa Monreal. He states he does not know what happened to the 9/72023 order for the concerta, but it could just be resent to the pharmacy. Please resend concerta order from 1/7/71 so they can fill prescription for patient since it is now available.

## 2023-10-27 ENCOUNTER — TELEPHONE (OUTPATIENT)
Age: 58
End: 2023-10-27

## 2023-10-27 NOTE — TELEPHONE ENCOUNTER
We received a fax refill request for [facility]. Please escribe Fluoxetine to their pharmacy. The pharmacy is correct in the chart and they are requesting a 30 day supply.

## 2023-11-01 ENCOUNTER — TELEPHONE (OUTPATIENT)
Age: 58
End: 2023-11-01

## 2023-11-01 NOTE — TELEPHONE ENCOUNTER
Returned patient call. He states he has not picked up prestiq yet so currently he is not on depression medication. He is only taking concerta and thyroid medication. Patient is confused as to what depression he is supposed to take. ( Citalopram, prestiq, prozac etc.)  One of them was causing him a brain fos. He saw orthopedic specialist yesterday, and she told him there was and antidepression medication that will help with his shoulder pain. Can Dr. Abdullahi Dominguez order that kind of medication to help with his shoulder pain and depression? What medication is patient supposed to be taking or depression?

## 2023-11-02 NOTE — TELEPHONE ENCOUNTER
Returned call to patient. Informed prestiq is desired medication that was discussed during office visit. Encouraged to  prescription. Patient verbalizes understanding.

## 2024-01-31 SDOH — ECONOMIC STABILITY: FOOD INSECURITY: WITHIN THE PAST 12 MONTHS, THE FOOD YOU BOUGHT JUST DIDN'T LAST AND YOU DIDN'T HAVE MONEY TO GET MORE.: NEVER TRUE

## 2024-01-31 SDOH — ECONOMIC STABILITY: INCOME INSECURITY: HOW HARD IS IT FOR YOU TO PAY FOR THE VERY BASICS LIKE FOOD, HOUSING, MEDICAL CARE, AND HEATING?: HARD

## 2024-01-31 SDOH — ECONOMIC STABILITY: FOOD INSECURITY: WITHIN THE PAST 12 MONTHS, YOU WORRIED THAT YOUR FOOD WOULD RUN OUT BEFORE YOU GOT MONEY TO BUY MORE.: PATIENT DECLINED

## 2024-01-31 SDOH — ECONOMIC STABILITY: HOUSING INSECURITY
IN THE LAST 12 MONTHS, WAS THERE A TIME WHEN YOU DID NOT HAVE A STEADY PLACE TO SLEEP OR SLEPT IN A SHELTER (INCLUDING NOW)?: NO

## 2024-01-31 SDOH — ECONOMIC STABILITY: TRANSPORTATION INSECURITY
IN THE PAST 12 MONTHS, HAS LACK OF TRANSPORTATION KEPT YOU FROM MEETINGS, WORK, OR FROM GETTING THINGS NEEDED FOR DAILY LIVING?: NO

## 2024-02-01 ENCOUNTER — TELEMEDICINE (OUTPATIENT)
Age: 59
End: 2024-02-01
Payer: COMMERCIAL

## 2024-02-01 DIAGNOSIS — N52.9 ERECTILE DYSFUNCTION, UNSPECIFIED ERECTILE DYSFUNCTION TYPE: ICD-10-CM

## 2024-02-01 DIAGNOSIS — J06.9 ACUTE UPPER RESPIRATORY INFECTION: Primary | ICD-10-CM

## 2024-02-01 DIAGNOSIS — J44.1 CHRONIC OBSTRUCTIVE PULMONARY DISEASE WITH (ACUTE) EXACERBATION (HCC): ICD-10-CM

## 2024-02-01 DIAGNOSIS — F33.41 RECURRENT MAJOR DEPRESSIVE DISORDER, IN PARTIAL REMISSION (HCC): ICD-10-CM

## 2024-02-01 PROCEDURE — 99214 OFFICE O/P EST MOD 30 MIN: CPT | Performed by: FAMILY MEDICINE

## 2024-02-01 RX ORDER — AZITHROMYCIN 250 MG/1
TABLET, FILM COATED ORAL
Qty: 6 TABLET | Refills: 0 | Status: SHIPPED | OUTPATIENT
Start: 2024-02-01

## 2024-02-01 RX ORDER — SILDENAFIL 100 MG/1
100 TABLET, FILM COATED ORAL PRN
Qty: 30 TABLET | Refills: 3 | Status: SHIPPED | OUTPATIENT
Start: 2024-02-01

## 2024-02-01 RX ORDER — BENZONATATE 200 MG/1
200 CAPSULE ORAL 3 TIMES DAILY PRN
Qty: 30 CAPSULE | Refills: 0 | Status: SHIPPED | OUTPATIENT
Start: 2024-02-01 | End: 2024-02-08

## 2024-02-01 NOTE — PROGRESS NOTES
Consent: Reggie Hodges, who was seen by synchronous (real-time) audio-video technology, and/or his healthcare decision maker, is aware that this patient-initiated, Telehealth encounter on 2/1/2024 is a billable service, with coverage as determined by his insurance carrier. He is aware that he may receive a bill and has provided verbal consent to proceed: YES-Consent obtained within past 12 months  712    Prior to Admission medications    Medication Sig Start Date End Date Taking? Authorizing Provider   sildenafil (VIAGRA) 100 MG tablet Take 1 tablet by mouth as needed for Erectile Dysfunction 2/1/24  Yes Shaggy Pastor MD   benzonatate (TESSALON) 200 MG capsule Take 1 capsule by mouth 3 times daily as needed for Cough 2/1/24 2/8/24 Yes Shaggy Pastor MD   azithromycin (ZITHROMAX) 250 MG tablet 2 tablets on day 1 followed by 1 tablet on days 2 - 5 2/1/24  Yes Shaggy Pastor MD   methylphenidate (CONCERTA) 54 MG extended release tablet Take 1 tablet by mouth daily for 30 days. Max Daily Amount: 54 mg 9/19/23 10/19/23  Shaggy Pastor MD   methylphenidate (CONCERTA) 54 MG extended release tablet Take 1 tablet by mouth daily for 30 days. Max Daily Amount: 54 mg 10/7/23 11/6/23  Shaggy Pastor MD   methylphenidate (CONCERTA) 54 MG extended release tablet Take 1 tablet by mouth daily for 30 days. Max Daily Amount: 54 mg 11/6/23 12/6/23  Shaggy Pastor MD   levothyroxine (SYNTHROID) 50 MCG tablet TAKE 1 TABLET BY MOUTH DAILY BEFORE AND BREAKFAST 9/7/23   Shaggy Pastor MD   methocarbamol (ROBAXIN) 750 MG tablet Take 1 tablet by mouth 3 times daily as needed 9/5/23   Provider, MD Ilya   albuterol sulfate HFA (PROVENTIL;VENTOLIN;PROAIR) 108 (90 Base) MCG/ACT inhaler Inhale 1 puff into the lungs every 4 hours as needed for Wheezing or Shortness of Breath 7/19/23   Shaggy Pastor MD   desvenlafaxine succinate (PRISTIQ) 100 MG TB24 extended release tablet Take 1 tablet by mouth daily 7/19/23   Darby

## 2024-06-25 ENCOUNTER — TELEPHONE (OUTPATIENT)
Age: 59
End: 2024-06-25

## 2024-06-25 RX ORDER — LEVOTHYROXINE SODIUM 0.05 MG/1
TABLET ORAL
Qty: 90 TABLET | Refills: 3 | Status: SHIPPED | OUTPATIENT
Start: 2024-06-25 | End: 2024-06-27 | Stop reason: SDUPTHER

## 2024-06-25 NOTE — TELEPHONE ENCOUNTER
Pt has an [facility] appt with you on 06/27/24 he took his last pill today levothyroxine (SYNTHROID) 50 MCG tablet. Can you send so in for him

## 2024-06-27 ENCOUNTER — TELEMEDICINE (OUTPATIENT)
Age: 59
End: 2024-06-27
Payer: COMMERCIAL

## 2024-06-27 DIAGNOSIS — Z12.5 PROSTATE CANCER SCREENING: ICD-10-CM

## 2024-06-27 DIAGNOSIS — E03.9 ACQUIRED HYPOTHYROIDISM: Primary | ICD-10-CM

## 2024-06-27 DIAGNOSIS — E78.2 MIXED HYPERLIPIDEMIA: ICD-10-CM

## 2024-06-27 DIAGNOSIS — R53.83 OTHER FATIGUE: ICD-10-CM

## 2024-06-27 DIAGNOSIS — R73.03 PREDIABETES: ICD-10-CM

## 2024-06-27 PROCEDURE — 99214 OFFICE O/P EST MOD 30 MIN: CPT | Performed by: FAMILY MEDICINE

## 2024-06-27 RX ORDER — LEVOTHYROXINE SODIUM 0.05 MG/1
TABLET ORAL
Qty: 90 TABLET | Refills: 3 | Status: SHIPPED | OUTPATIENT
Start: 2024-06-27

## 2024-06-27 SDOH — ECONOMIC STABILITY: FOOD INSECURITY: WITHIN THE PAST 12 MONTHS, YOU WORRIED THAT YOUR FOOD WOULD RUN OUT BEFORE YOU GOT MONEY TO BUY MORE.: NEVER TRUE

## 2024-06-27 SDOH — ECONOMIC STABILITY: FOOD INSECURITY: WITHIN THE PAST 12 MONTHS, THE FOOD YOU BOUGHT JUST DIDN'T LAST AND YOU DIDN'T HAVE MONEY TO GET MORE.: NEVER TRUE

## 2024-06-27 SDOH — ECONOMIC STABILITY: INCOME INSECURITY: HOW HARD IS IT FOR YOU TO PAY FOR THE VERY BASICS LIKE FOOD, HOUSING, MEDICAL CARE, AND HEATING?: NOT HARD AT ALL

## 2024-06-27 ASSESSMENT — PATIENT HEALTH QUESTIONNAIRE - PHQ9
SUM OF ALL RESPONSES TO PHQ QUESTIONS 1-9: 0
1. LITTLE INTEREST OR PLEASURE IN DOING THINGS: NOT AT ALL
SUM OF ALL RESPONSES TO PHQ QUESTIONS 1-9: 0
SUM OF ALL RESPONSES TO PHQ9 QUESTIONS 1 & 2: 0
2. FEELING DOWN, DEPRESSED OR HOPELESS: NOT AT ALL
SUM OF ALL RESPONSES TO PHQ QUESTIONS 1-9: 0
SUM OF ALL RESPONSES TO PHQ QUESTIONS 1-9: 0

## 2024-06-27 NOTE — PROGRESS NOTES
Reggie Hodges (:  1965) is a 58 y.o. male, Established patient, here for evaluation of the following chief complaint(s):  No chief complaint on file.         Assessment & Plan  1. Hypothyroidism.  A prescription refill for levothyroxine has been issued. Additionally, lab work has been ordered.    2. Fatigue.  Testosterone and thyroid levels will be assessed. A referral for a sleep study has been made.    3. Right hip pain.  The patient has been advised to perform butterfly stretches and take over-the-counter glucosamine and chondroitin supplements.    Results    1. Acquired hypothyroidism  -     TSH; Future  2. Mixed hyperlipidemia  -     Comprehensive Metabolic Panel; Future  -     Lipid Panel; Future  3. Prediabetes  -     Hemoglobin A1C; Future  -     Comprehensive Metabolic Panel; Future  4. Other fatigue  -     Testosterone, free, total; Future  -     [facility] - Charlene Arambula MD, Sleep Medicine, [place]  5. Prostate cancer screening  -     PSA Screening; Future    No follow-ups on file.       Subjective   History of Present Illness  The patient presents via virtual visit for evaluation of multiple medical concerns.    The patient has been without his thyroid medication for the past 2 days. He reports experiencing fatigue, particularly during periods of inactivity, such as driving. Despite achieving 6.5 to 7 hours of sleep per night, he struggles to maintain wakefulness. He suspects that his testosterone levels are low and experiences pain behind his eyes. He acknowledges snoring, although his wife has observed him snoring. Despite this, he generally sleeps through the night. He has discontinued the use of Pristiq and continues to take levothyroxine. He has not undergone a sleep study in the past.    The patient reports experiencing a pinching sensation in his right hip, particularly when rinsing his hair.   He is allergic to TREE POLLEN.    Review of Systems       Objective   There were no

## 2024-07-01 DIAGNOSIS — E03.9 ACQUIRED HYPOTHYROIDISM: ICD-10-CM

## 2024-07-01 DIAGNOSIS — R73.03 PREDIABETES: ICD-10-CM

## 2024-07-01 DIAGNOSIS — R53.83 OTHER FATIGUE: ICD-10-CM

## 2024-07-01 DIAGNOSIS — Z12.5 PROSTATE CANCER SCREENING: ICD-10-CM

## 2024-07-01 DIAGNOSIS — E78.2 MIXED HYPERLIPIDEMIA: ICD-10-CM

## 2024-07-02 LAB
CHOLEST SERPL-MCNC: 182 MG/DL (ref 100–199)
HBA1C MFR BLD: 5.6 % (ref 4.8–5.6)
HDLC SERPL-MCNC: 59 MG/DL
LDLC SERPL CALC-MCNC: 114 MG/DL (ref 0–99)
PSA SERPL-MCNC: 0.9 NG/ML (ref 0–4)
TRIGL SERPL-MCNC: 45 MG/DL (ref 0–149)
TSH SERPL DL<=0.005 MIU/L-ACNC: 2.9 UIU/ML (ref 0.45–4.5)
VLDLC SERPL CALC-MCNC: 9 MG/DL (ref 5–40)

## 2024-07-03 LAB
ALBUMIN SERPL-MCNC: 4.5 G/DL (ref 3.8–4.9)
ALP SERPL-CCNC: 79 IU/L (ref 44–121)
ALT SERPL-CCNC: 12 IU/L (ref 0–44)
AST SERPL-CCNC: 24 IU/L (ref 0–40)
BILIRUB SERPL-MCNC: 0.4 MG/DL (ref 0–1.2)
BUN SERPL-MCNC: 11 MG/DL (ref 8–27)
BUN/CREAT SERPL: 11 (ref 10–24)
CALCIUM SERPL-MCNC: 9.1 MG/DL (ref 8.6–10.2)
CHLORIDE SERPL-SCNC: 102 MMOL/L (ref 96–106)
CO2 SERPL-SCNC: 25 MMOL/L (ref 20–29)
CREAT SERPL-MCNC: 1 MG/DL (ref 0.76–1.27)
EGFRCR SERPLBLD CKD-EPI 2021: 86 ML/MIN/1.73
GLOBULIN SER CALC-MCNC: 2.2 G/DL (ref 1.5–4.5)
GLUCOSE SERPL-MCNC: 96 MG/DL (ref 70–99)
IMP & REVIEW OF LAB RESULTS: NORMAL
POTASSIUM SERPL-SCNC: 4.3 MMOL/L (ref 3.5–5.2)
PROT SERPL-MCNC: 6.7 G/DL (ref 6–8.5)
SODIUM SERPL-SCNC: 141 MMOL/L (ref 134–144)

## 2024-07-04 LAB
TESTOST FREE SERPL-MCNC: 7.4 PG/ML (ref 6.6–18.1)
TESTOST SERPL-MCNC: 750 NG/DL (ref 264–916)

## 2024-07-26 NOTE — PROGRESS NOTES
[facility] needs additional information on pt before they can consider the PA.  Please call back CB# [phone] to speak with [facility]
LM on  returning call.
PA for [facility] submitted to [facility] via cover my meds , awaiting response.
No

## 2024-08-29 DIAGNOSIS — N52.9 ERECTILE DYSFUNCTION, UNSPECIFIED ERECTILE DYSFUNCTION TYPE: ICD-10-CM

## 2024-08-29 RX ORDER — SILDENAFIL 100 MG/1
100 TABLET, FILM COATED ORAL PRN
Qty: 30 TABLET | Refills: 3 | Status: SHIPPED | OUTPATIENT
Start: 2024-08-29

## 2024-09-24 RX ORDER — LEVOTHYROXINE SODIUM 50 UG/1
TABLET ORAL
Qty: 90 TABLET | Refills: 3 | Status: SHIPPED | OUTPATIENT
Start: 2024-09-24

## 2024-09-27 ENCOUNTER — TELEPHONE (OUTPATIENT)
Age: 59
End: 2024-09-27

## 2024-09-27 NOTE — TELEPHONE ENCOUNTER
Reggie Hodges needs a refill of Albuterol Sulfate HFA.  They have 0 pills/supply left and are requesting a ? day supply with refills.  Pharmacy has been updated in the chart. Patient was advised or scheduled an appointment for the future and to request refills thru the [facility] or by requesting a refill from their pharmacy in the future.  Patient was also advised to check with their pharmacy for status of when refills are available.    Reggie Hodges needs a refill of Albuterol Nebulizer Solution.  They have 0 pills/supply left and are requesting a ? day supply with refills.  Pharmacy has been updated in the chart. Patient was advised or scheduled an appointment for the future and to request refills thru the [facility] or by requesting a refill from their pharmacy in the future.  Patient was also advised to check with their pharmacy for status of when refills are available.

## 2024-09-30 ENCOUNTER — TELEPHONE (OUTPATIENT)
Age: 59
End: 2024-09-30

## 2024-09-30 RX ORDER — ALBUTEROL SULFATE 0.83 MG/ML
2.5 SOLUTION RESPIRATORY (INHALATION) EVERY 4 HOURS PRN
Qty: 120 EACH | Refills: 5 | Status: SHIPPED | OUTPATIENT
Start: 2024-09-30

## 2024-09-30 RX ORDER — ALBUTEROL SULFATE 90 UG/1
1 INHALANT RESPIRATORY (INHALATION) EVERY 4 HOURS PRN
Qty: 18 G | Refills: 5 | Status: SHIPPED | OUTPATIENT
Start: 2024-09-30

## 2024-10-14 ENCOUNTER — TELEPHONE (OUTPATIENT)
Age: 59
End: 2024-10-14

## 2024-10-14 NOTE — TELEPHONE ENCOUNTER
Pt is take the albuterol sulfate and is doing a puff about every 3 hours and having uncontrolling cough  Wheezing or Shortness of Breath

## 2024-10-15 ENCOUNTER — HOSPITAL ENCOUNTER (EMERGENCY)
Facility: HOSPITAL | Age: 59
Discharge: HOME | End: 2024-10-15
Attending: EMERGENCY MEDICINE
Payer: COMMERCIAL

## 2024-10-15 ENCOUNTER — APPOINTMENT (OUTPATIENT)
Facility: HOSPITAL | Age: 59
End: 2024-10-15
Payer: COMMERCIAL

## 2024-10-15 VITALS
HEART RATE: 74 BPM | RESPIRATION RATE: 16 BRPM | BODY MASS INDEX: 25.77 KG/M2 | TEMPERATURE: 97.8 F | OXYGEN SATURATION: 96 % | SYSTOLIC BLOOD PRESSURE: 143 MMHG | DIASTOLIC BLOOD PRESSURE: 86 MMHG | HEIGHT: 70 IN | WEIGHT: 180 LBS

## 2024-10-15 DIAGNOSIS — J40 BRONCHITIS: Primary | ICD-10-CM

## 2024-10-15 DIAGNOSIS — J44.1 COPD EXACERBATION (HCC): ICD-10-CM

## 2024-10-15 PROCEDURE — 2580000003 HC RX 258: Performed by: PHYSICIAN ASSISTANT

## 2024-10-15 PROCEDURE — 71046 X-RAY EXAM CHEST 2 VIEWS: CPT

## 2024-10-15 PROCEDURE — 96372 THER/PROPH/DIAG INJ SC/IM: CPT

## 2024-10-15 PROCEDURE — 6360000002 HC RX W HCPCS: Performed by: PHYSICIAN ASSISTANT

## 2024-10-15 PROCEDURE — 99284 EMERGENCY DEPT VISIT MOD MDM: CPT

## 2024-10-15 PROCEDURE — 6370000000 HC RX 637 (ALT 250 FOR IP): Performed by: PHYSICIAN ASSISTANT

## 2024-10-15 RX ORDER — ACETAMINOPHEN 500 MG
1000 TABLET ORAL 4 TIMES DAILY PRN
Qty: 40 TABLET | Refills: 0 | Status: SHIPPED | OUTPATIENT
Start: 2024-10-15

## 2024-10-15 RX ORDER — CETIRIZINE HYDROCHLORIDE 10 MG/1
10 TABLET ORAL DAILY
Qty: 30 TABLET | Refills: 0 | Status: SHIPPED | OUTPATIENT
Start: 2024-10-15

## 2024-10-15 RX ORDER — AZITHROMYCIN 250 MG/1
TABLET, FILM COATED ORAL
Qty: 6 TABLET | Refills: 0 | Status: SHIPPED | OUTPATIENT
Start: 2024-10-15 | End: 2024-10-25

## 2024-10-15 RX ORDER — DEXTROMETHORPHAN HYDROBROMIDE AND PROMETHAZINE HYDROCHLORIDE 15; 6.25 MG/5ML; MG/5ML
2.5 SYRUP ORAL NIGHTLY PRN
Qty: 30 ML | Refills: 0 | Status: SHIPPED | OUTPATIENT
Start: 2024-10-15

## 2024-10-15 RX ORDER — PREDNISONE 20 MG/1
40 TABLET ORAL DAILY
Qty: 10 TABLET | Refills: 0 | Status: SHIPPED | OUTPATIENT
Start: 2024-10-15 | End: 2024-10-20

## 2024-10-15 RX ORDER — IPRATROPIUM BROMIDE AND ALBUTEROL SULFATE 2.5; .5 MG/3ML; MG/3ML
1 SOLUTION RESPIRATORY (INHALATION)
Status: COMPLETED | OUTPATIENT
Start: 2024-10-15 | End: 2024-10-15

## 2024-10-15 RX ADMIN — METHYLPREDNISOLONE SODIUM SUCCINATE 125 MG: 125 INJECTION INTRAMUSCULAR; INTRAVENOUS at 11:23

## 2024-10-15 RX ADMIN — IPRATROPIUM BROMIDE AND ALBUTEROL SULFATE 1 DOSE: 2.5; .5 SOLUTION RESPIRATORY (INHALATION) at 11:25

## 2024-10-15 ASSESSMENT — PAIN - FUNCTIONAL ASSESSMENT: PAIN_FUNCTIONAL_ASSESSMENT: NONE - DENIES PAIN

## 2024-10-15 NOTE — ED PROVIDER NOTES
Sig Dispense Refill    promethazine-dextromethorphan (PROMETHAZINE-DM) 6.25-15 MG/5ML syrup Take 2.5 mLs by mouth nightly as needed for Cough 30 mL 0    acetaminophen (TYLENOL) 500 MG tablet Take 2 tablets by mouth 4 times daily as needed for Pain 40 tablet 0    predniSONE (DELTASONE) 20 MG tablet Take 2 tablets by mouth daily for 5 days 10 tablet 0    cetirizine (ZYRTEC) 10 MG tablet Take 1 tablet by mouth daily 30 tablet 0    azithromycin (ZITHROMAX) 250 MG tablet 500mg on day 1 followed by 250mg on days 2 - 5 6 tablet 0    albuterol (PROVENTIL) (2.5 MG/3ML) 0.083% nebulizer solution Take 3 mLs by nebulization every 4 hours as needed for Wheezing 120 each 5    albuterol sulfate HFA (PROVENTIL;VENTOLIN;PROAIR) 108 (90 Base) MCG/ACT inhaler Inhale 1 puff into the lungs every 4 hours as needed for Wheezing or Shortness of Breath 18 g 5    levothyroxine (SYNTHROID) 50 MCG tablet TAKE 1 TABLET BY MOUTH EVERY DAY BEFORE BREAKFAST 90 tablet 3    sildenafil (VIAGRA) 100 MG tablet Take 1 tablet by mouth as needed for Erectile Dysfunction 30 tablet 3       Social Determinants of Health:   Social Determinants of Health     Tobacco Use: Medium Risk (10/15/2024)    Patient History     Smoking Tobacco Use: Former     Smokeless Tobacco Use: Former     Passive Exposure: Not on file   Alcohol Use: Not on file   Financial Resource Strain: Low Risk  (6/27/2024)    Overall Financial Resource Strain (CARDIA)     Difficulty of Paying Living Expenses: Not hard at all   Food Insecurity: No Food Insecurity (6/27/2024)    Hunger Vital Sign     Worried About Running Out of Food in the Last Year: Never true     Ran Out of Food in the Last Year: Never true   Transportation Needs: Unknown (6/27/2024)    PRAPARE - Transportation     Lack of Transportation (Medical): Not on file     Lack of Transportation (Non-Medical): No   Physical Activity: Not on file   Stress: Not on file   Social Connections: Not on file   Intimate Partner Violence: Not on

## 2024-10-15 NOTE — ED TRIAGE NOTES
Pt reports SOB, +hx of asthma and COPD. Using neb tx's at home with some relief. RR 18-20, RA SpO2 97%, able to speak in full sentences without pausing or difficulty. +productive yellow cough reported.

## 2024-10-16 NOTE — TELEPHONE ENCOUNTER
Call out to patient. He went to ED yesterday. Started on prednisone and antibiotics. CXR normal.Still having bad coughing episodes at night. Sleeping sitting in a chair. Breathing is slightly better. Encouraged fluids/ electrolytes and plenty of rest. He is anxious to feel better by Saturday, his daughter is getting  and would like to feel better to walk her down the [place]le. Encouraged rest and patience. Informed to call if he needs anything. Patient verbalizes understanding.

## 2024-10-31 ENCOUNTER — OFFICE VISIT (OUTPATIENT)
Age: 59
End: 2024-10-31
Payer: COMMERCIAL

## 2024-10-31 VITALS
TEMPERATURE: 98.7 F | BODY MASS INDEX: 26.2 KG/M2 | HEART RATE: 75 BPM | HEIGHT: 70 IN | SYSTOLIC BLOOD PRESSURE: 130 MMHG | OXYGEN SATURATION: 96 % | DIASTOLIC BLOOD PRESSURE: 89 MMHG | RESPIRATION RATE: 20 BRPM | WEIGHT: 183 LBS

## 2024-10-31 DIAGNOSIS — Z82.49 FAMILY HISTORY OF CORONARY ARTERY DISEASE: ICD-10-CM

## 2024-10-31 DIAGNOSIS — J42 CHRONIC BRONCHITIS, UNSPECIFIED CHRONIC BRONCHITIS TYPE (HCC): ICD-10-CM

## 2024-10-31 DIAGNOSIS — R06.02 SHORTNESS OF BREATH: Primary | ICD-10-CM

## 2024-10-31 PROCEDURE — 93000 ELECTROCARDIOGRAM COMPLETE: CPT | Performed by: PHYSICIAN ASSISTANT

## 2024-10-31 PROCEDURE — 99214 OFFICE O/P EST MOD 30 MIN: CPT | Performed by: PHYSICIAN ASSISTANT

## 2024-10-31 RX ORDER — PREDNISONE 10 MG/1
TABLET ORAL
Qty: 48 EACH | Refills: 0 | Status: SHIPPED | OUTPATIENT
Start: 2024-10-31

## 2024-10-31 RX ORDER — FLUTICASONE PROPIONATE AND SALMETEROL 250; 50 UG/1; UG/1
1 POWDER RESPIRATORY (INHALATION) EVERY 12 HOURS
Qty: 60 EACH | Refills: 3 | Status: SHIPPED | OUTPATIENT
Start: 2024-10-31

## 2024-10-31 ASSESSMENT — PATIENT HEALTH QUESTIONNAIRE - PHQ9
6. FEELING BAD ABOUT YOURSELF - OR THAT YOU ARE A FAILURE OR HAVE LET YOURSELF OR YOUR FAMILY DOWN: NOT AT ALL
4. FEELING TIRED OR HAVING LITTLE ENERGY: NOT AT ALL
SUM OF ALL RESPONSES TO PHQ QUESTIONS 1-9: 0
2. FEELING DOWN, DEPRESSED OR HOPELESS: NOT AT ALL
1. LITTLE INTEREST OR PLEASURE IN DOING THINGS: NOT AT ALL
SUM OF ALL RESPONSES TO PHQ QUESTIONS 1-9: 0
SUM OF ALL RESPONSES TO PHQ9 QUESTIONS 1 & 2: 0
7. TROUBLE CONCENTRATING ON THINGS, SUCH AS READING THE NEWSPAPER OR WATCHING TELEVISION: NOT AT ALL
3. TROUBLE FALLING OR STAYING ASLEEP: NOT AT ALL
10. IF YOU CHECKED OFF ANY PROBLEMS, HOW DIFFICULT HAVE THESE PROBLEMS MADE IT FOR YOU TO DO YOUR WORK, TAKE CARE OF THINGS AT HOME, OR GET ALONG WITH OTHER PEOPLE: NOT DIFFICULT AT ALL
5. POOR APPETITE OR OVEREATING: NOT AT ALL
9. THOUGHTS THAT YOU WOULD BE BETTER OFF DEAD, OR OF HURTING YOURSELF: NOT AT ALL
8. MOVING OR SPEAKING SO SLOWLY THAT OTHER PEOPLE COULD HAVE NOTICED. OR THE OPPOSITE, BEING SO FIGETY OR RESTLESS THAT YOU HAVE BEEN MOVING AROUND A LOT MORE THAN USUAL: NOT AT ALL

## 2024-10-31 NOTE — PROGRESS NOTES
Reggie Hodges is a 59 y.o. male , id x 2(name and ). Reviewed record, history, and  medications.      Chief Complaint   Patient presents with    Wheezing     Patient c/o wheezing and sob, patient having pressure behind eyes, cough not productive x3 weeks, got better, but getting worse, getting winded just walking into work, memory is bad, patient states he is a walking zombie, feels like falling asleep as soon as he gets to work. Patient not getting regular sleep. Patient feeling tightness in his throat area.          Vitals:    10/31/24 1545   BP: 130/89   Site: Left Upper Arm   Position: Sitting   Cuff Size: Large Adult   Pulse: 75   Resp: 20   Temp: 98.7 °F (37.1 °C)   TempSrc: Oral   SpO2: 96%   Weight: 83 kg (183 lb)   Height: 1.778 m (5' 10\")             10/31/2024     3:48 PM   PHQ-9    Little interest or pleasure in doing things 0   Feeling down, depressed, or hopeless 0   Trouble falling or staying asleep, or sleeping too much 0   Feeling tired or having little energy 0   Poor appetite or overeating 0   Feeling bad about yourself - or that you are a failure or have let yourself or your family down 0   Trouble concentrating on things, such as reading the newspaper or watching television 0   Moving or speaking so slowly that other people could have noticed. Or the opposite - being so fidgety or restless that you have been moving around a lot more than usual 0   Thoughts that you would be better off dead, or of hurting yourself in some way 0   PHQ-2 Score 0   PHQ-9 Total Score 0   If you checked off any problems, how difficult have these problems made it for you to do your work, take care of things at home, or get along with other people? 0            No data to display                Social Determinants of Health     Tobacco Use: Medium Risk (10/15/2024)    Patient History     Smoking Tobacco Use: Former     Smokeless Tobacco Use: Former     Passive Exposure: Not on file   Alcohol Use: Not on file 
Shortness of Breath     Associated Diagnoses:  --     cetirizine (ZYRTEC) 10 MG tablet  10/15/24  --     Take 1 tablet by mouth daily     Associated Diagnoses:  --     levothyroxine (SYNTHROID) 50 MCG tablet  09/24/24  --     TAKE 1 TABLET BY MOUTH EVERY DAY BEFORE BREAKFAST     Associated Diagnoses:  --     sildenafil (VIAGRA) 100 MG tablet  08/29/24  --     Take 1 tablet by mouth as needed for Erectile Dysfunction     Associated Diagnoses:  Erectile dysfunction, unspecified erectile dysfunction type             Patient Active Problem List   Diagnosis Code    Insomnia G47.00    Cervical lymphadenitis I88.9    DNS (deviated nasal septum) J34.2    Prediabetes R73.03    Lacunar infarction (HCC) I63.81    Mixed hyperlipidemia E78.2    Chronic obstructive pulmonary disease (HCC) J44.9    Hypothyroidism E03.9    Abdominal bloating R14.0    Acute viral syndrome B34.9    Attention deficit hyperactivity disorder (ADHD), combined type F90.2    Recurrent major depressive disorder, in partial remission (HCC) F33.41        Review of Systems - negative except as listed above in the HPI    Time was used for level of billing: yes, 35 minutes reviewing previous notes, test results and office visit with the patient discussing the diagnosis and importance of compliance with the treatment plan as well as documenting on the day of the visit.      The patient (or guardian, if applicable) and other individuals in attendance with the patient were advised that Artificial Intelligence will be utilized during this visit to record and process the conversation to generate a clinical note. The patient (or guardian, if applicable) and other individuals in attendance at the appointment consented to the use of AI, including the recording.           I have discussed the diagnosis with the patient and the intended plan as seen in the above orders.  The patient understands and agrees with the plan. The patient has received an after-visit summary and

## 2025-01-08 ENCOUNTER — OFFICE VISIT (OUTPATIENT)
Age: 60
End: 2025-01-08
Payer: COMMERCIAL

## 2025-01-08 VITALS
DIASTOLIC BLOOD PRESSURE: 82 MMHG | HEIGHT: 70 IN | BODY MASS INDEX: 27.92 KG/M2 | WEIGHT: 195 LBS | OXYGEN SATURATION: 96 % | HEART RATE: 78 BPM | SYSTOLIC BLOOD PRESSURE: 130 MMHG

## 2025-01-08 DIAGNOSIS — R06.02 SHORTNESS OF BREATH: Primary | ICD-10-CM

## 2025-01-08 DIAGNOSIS — E78.2 MIXED HYPERLIPIDEMIA: ICD-10-CM

## 2025-01-08 PROCEDURE — 99204 OFFICE O/P NEW MOD 45 MIN: CPT | Performed by: SPECIALIST

## 2025-01-08 PROCEDURE — 93000 ELECTROCARDIOGRAM COMPLETE: CPT | Performed by: SPECIALIST

## 2025-01-08 NOTE — PATIENT INSTRUCTIONS
Patient Education        Learning About the Mediterranean Diet  What is the Mediterranean diet?     The Mediterranean diet is a style of eating rather than a diet plan. It features foods eaten in [place], [place], [place], and other countries along the [place] Sea. It emphasizes eating foods like fish, fruits, vegetables, beans, high-fiber breads and whole grains, nuts, and olive oil. This style of eating includes limited red meat, cheese, and sweets.  Why choose the Mediterranean diet?  A Mediterranean-style diet may improve heart health. It contains more fat than other heart-healthy diets. But the fats are mainly from nuts, unsaturated oils (such as fish oils and olive oil), and certain nut or seed oils (such as canola, soybean, or flaxseed oil). These fats may help protect the heart and blood vessels.  How can you get started on the Mediterranean diet?  Here are some things you can do to switch to a more Mediterranean way of eating.  What to eat  Eat a variety of fruits and vegetables each day, such as grapes, blueberries, tomatoes, broccoli, peppers, figs, olives, spinach, eggplant, beans, lentils, and chickpeas.  Eat a variety of whole-grain foods each day, such as oats, brown rice, and whole wheat bread, pasta, and couscous.  Eat fish at least 2 times a week. Try tuna, salmon, mackerel, lake trout, herring, or sardines.  Eat moderate amounts of low-fat dairy products, such as milk, cheese, or yogurt.  Eat moderate amounts of poultry and eggs.  Choose healthy (unsaturated) fats, such as nuts, olive oil, and certain nut or seed oils like canola, soybean, and flaxseed.  Limit unhealthy (saturated) fats, such as butter, palm oil, and coconut oil. And limit fats found in animal products, such as meat and dairy products made with whole milk. Try to eat red meat only a few times a month in very small amounts.  Limit sweets and desserts to only a few times a week. This includes sugar-sweetened  PACU/Home

## 2025-01-08 NOTE — PROGRESS NOTES
Chief Complaint   Patient presents with    New Patient    Shortness of Breath     Vitals:    01/08/25 1515   BP: 130/82   Site: Left Upper Arm   Position: Sitting   Pulse: 78   SpO2: 96%   Weight: 88.5 kg (195 lb)   Height: 1.778 m (5' 10\")         Chest pain: DENIED     Recent hospital stays: DENIED     Refills: DENIED   
LABGLOM 86 07/01/2024    GFRAA >60 11/25/2021    AGRATIO 1.9 07/19/2023    GLOB 3.9 11/25/2021       Lab Results   Component Value Date    WBC 9.4 11/25/2021    HGB 13.8 11/25/2021    HCT 41.8 11/25/2021    MCV 93.5 11/25/2021     11/25/2021       Lab Results   Component Value Date    TSH 2.900 07/01/2024       Lab Results   Component Value Date    CHOL 182 07/01/2024    TRIG 45 07/01/2024    HDL 59 07/01/2024     (H) 07/01/2024    VLDL 9 07/01/2024     Hemoglobin A1C   Date Value Ref Range Status   07/01/2024 5.6 4.8 - 5.6 % Final     Comment:                 Prediabetes: 5.7 - 6.4           Diabetes: >6.4           Glycemic control for adults with diabetes: <7.0               CARDIAC DIAGNOSTICS:     Cardiac Evaluation Includes:  I reviewed the test results below.    EKG 10/31/24 - NSR, normal   EKG 1/8/25 - NSR, normal         ASSESSMENT AND PLAN:     Assessment and Plan:    SOB and FH of heart disease   - FH of MI's and CVA's  - Has SOB associated with asthma / COPD  - check an echo and treadmill stress test for cardiac evaluation   - Check a CT heart scan     Possible JUAN LUIS - snores and is tired   - advised him that we look into sleep apnea - he will think about it and speak to PCP     Lipids fair   - diet info given   - consider a CT heart scan     Phone FU.  See me PRN      with 4 kids and 14 grand kids.      Sharri Jasso MD, FACC    [facility]  [facility]  [address] [address], [address]  [address]  [place], [place]  [address]  [place] [address]  Ph: [phone]   Ph [phone]

## 2025-04-05 ENCOUNTER — RESULTS FOLLOW-UP (OUTPATIENT)
Age: 60
End: 2025-04-05